# Patient Record
Sex: MALE | Race: BLACK OR AFRICAN AMERICAN | NOT HISPANIC OR LATINO | Employment: FULL TIME | URBAN - METROPOLITAN AREA
[De-identification: names, ages, dates, MRNs, and addresses within clinical notes are randomized per-mention and may not be internally consistent; named-entity substitution may affect disease eponyms.]

---

## 2018-04-16 ENCOUNTER — APPOINTMENT (EMERGENCY)
Dept: RADIOLOGY | Facility: HOSPITAL | Age: 38
End: 2018-04-16
Payer: COMMERCIAL

## 2018-04-16 ENCOUNTER — HOSPITAL ENCOUNTER (EMERGENCY)
Facility: HOSPITAL | Age: 38
Discharge: HOME | End: 2018-04-16
Attending: EMERGENCY MEDICINE | Admitting: EMERGENCY MEDICINE
Payer: COMMERCIAL

## 2018-04-16 VITALS — WEIGHT: 220 LBS | OXYGEN SATURATION: 99 % | RESPIRATION RATE: 18 BRPM | TEMPERATURE: 98.7 F | HEART RATE: 93 BPM

## 2018-04-16 DIAGNOSIS — S93.491A SPRAIN OF ANTERIOR TALOFIBULAR LIGAMENT OF RIGHT ANKLE, INITIAL ENCOUNTER: Primary | ICD-10-CM

## 2018-04-16 PROCEDURE — 73610 X-RAY EXAM OF ANKLE: CPT

## 2018-04-16 PROCEDURE — 99283 EMERGENCY DEPT VISIT LOW MDM: CPT

## 2018-04-16 PROCEDURE — 73630 X-RAY EXAM OF FOOT: CPT

## 2018-04-16 RX ORDER — IBUPROFEN 400 MG/1
800 TABLET ORAL ONCE
Status: COMPLETED | OUTPATIENT
Start: 2018-04-16 | End: 2018-04-16

## 2018-04-16 RX ORDER — NAPROXEN 500 MG/1
500 TABLET ORAL 2 TIMES DAILY WITH MEALS
Qty: 14 TABLET | Refills: 0 | Status: SHIPPED | OUTPATIENT
Start: 2018-04-16 | End: 2018-05-02 | Stop reason: SDUPTHER

## 2018-04-16 RX ADMIN — IBUPROFEN 800 MG: 400 TABLET ORAL at 11:55

## 2018-04-16 NOTE — DISCHARGE INSTRUCTIONS

## 2018-04-16 NOTE — ED PROVIDER NOTES
History  Chief Complaint   Patient presents with    Ankle Swelling     pt here with right foot and ankle swelling  Hx of plantar fasciitis but did have a fall over the weekend  Ankle Swelling   Location:  Foot and ankle  Time since incident:  2 days  Injury: no    Ankle location:  R ankle  Foot location:  Dorsum of R foot  Pain details:     Quality:  Aching    Radiates to: right leg and knee  Severity:  Moderate    Onset quality:  Gradual    Duration:  2 days    Timing:  Constant    Progression:  Worsening  Chronicity:  New  Dislocation: no    Foreign body present:  No foreign bodies  Prior injury to area:  No  Relieved by:  None tried  Worsened by:  Bearing weight  Ineffective treatments:  None tried  Associated symptoms: no back pain, no decreased ROM, no fatigue, no fever, no itching, no muscle weakness, no neck pain, no numbness, no stiffness, no swelling and no tingling        None       Past Medical History:   Diagnosis Date    Gout        History reviewed  No pertinent surgical history  History reviewed  No pertinent family history  I have reviewed and agree with the history as documented  Social History   Substance Use Topics    Smoking status: Never Smoker    Smokeless tobacco: Never Used    Alcohol use No        Review of Systems   Constitutional: Negative for activity change, appetite change, chills, fatigue and fever  HENT: Negative for ear pain, sneezing and sore throat  Eyes: Negative for pain and visual disturbance  Respiratory: Negative for cough and shortness of breath  Cardiovascular: Negative for chest pain and palpitations  Gastrointestinal: Negative for abdominal pain, blood in stool, constipation, diarrhea, nausea and vomiting  Genitourinary: Negative for dysuria and hematuria  Musculoskeletal: Positive for joint swelling  Negative for arthralgias, back pain, neck pain, neck stiffness and stiffness  Skin: Negative for itching, rash and wound  Neurological: Negative for dizziness, weakness, light-headedness, numbness and headaches  All other systems reviewed and are negative  Physical Exam  ED Triage Vitals [04/16/18 1037]   Temperature Pulse Respirations BP SpO2   98 7 °F (37 1 °C) 93 18 -- 99 %      Temp Source Heart Rate Source Patient Position - Orthostatic VS BP Location FiO2 (%)   Oral Monitor Sitting Left arm --      Pain Score       Worst Possible Pain           Orthostatic Vital Signs  Vitals:    04/16/18 1037   Pulse: 93   Patient Position - Orthostatic VS: Sitting       Physical Exam   Constitutional: He is oriented to person, place, and time  He appears well-developed and well-nourished  No distress  HENT:   Head: Normocephalic and atraumatic  Nose: Nose normal    Eyes: Conjunctivae and EOM are normal  Pupils are equal, round, and reactive to light  Neck: Normal range of motion  Neck supple  Cardiovascular: Normal rate, regular rhythm, normal heart sounds and intact distal pulses  Exam reveals no gallop and no friction rub  No murmur heard  Pulmonary/Chest: Effort normal and breath sounds normal  No respiratory distress  He has no wheezes  He has no rales  Abdominal: Soft  He exhibits no distension  There is no tenderness  Musculoskeletal: Normal range of motion  He exhibits tenderness  He exhibits no edema or deformity  Tenderness to dorsal aspect of 3 metatarsal  Full AROM of right ankle  No proc fibular tenderness  No calf tenderness  Full AROM of right ankle, knee, and hip   Lymphadenopathy:     He has no cervical adenopathy  Neurological: He is alert and oriented to person, place, and time  He displays normal reflexes  No cranial nerve deficit or sensory deficit  Skin: Skin is warm and dry  Capillary refill takes less than 2 seconds  He is not diaphoretic  No erythema  No pallor  Nursing note and vitals reviewed        ED Medications  Medications   ibuprofen (MOTRIN) tablet 800 mg (800 mg Oral Given 4/16/18 1155)       Diagnostic Studies  Results Reviewed     None                 XR foot 3+ views RIGHT   Final Result by Valarie De La O DO (04/16 1100)   Mild degenerative changes  No acute osseous abnormality  Workstation performed: [id number]         XR ankle 3+ views RIGHT   Final Result by Valarie De La O DO (04/16 1059)   Mild lateral soft tissue swelling  No acute fracture or dislocation  Workstation performed: [id number]                    Procedures  Procedures       Phone Contacts  ED Phone Contact    ED Course  ED Course                                MDM  Number of Diagnoses or Management Options  Sprain of anterior talofibular ligament of right ankle, initial encounter: new and requires workup  Diagnosis management comments: Patient is a 27-year-old male with no significant past medical history presenting to the emergency department for evaluation of ankle pain  Patient states he woke up this morning with severe right ankle pain  He states that he is on his feet often a working cannot remember any specific injuries but has severe tenderness to palpation of the right ankle anterior talofibular ligament  The patient with mild swelling lateral ankle noted  No bony tenderness palpation however does have tenderness over ligaments  No proximal fibular tenderness  Flexor range motion of right ankle and knee  Plan will be x-ray rule out fracture  Otherwise will Ace wrap, apply air cast, patient follow up with Orthopedics as needed  NSAIDs, rest, ice, compression, elevation  Patient given strict return precautions         Amount and/or Complexity of Data Reviewed  Tests in the radiology section of CPT®: ordered and reviewed  Independent visualization of images, tracings, or specimens: yes    Risk of Complications, Morbidity, and/or Mortality  Presenting problems: low  Diagnostic procedures: low  Management options: low    Patient Progress  Patient progress: improved    CritCare Time    Disposition  Final diagnoses:   Sprain of anterior talofibular ligament of right ankle, initial encounter     Time reflects when diagnosis was documented in both MDM as applicable and the Disposition within this note     Time User Action Codes Description Comment    4/16/2018 11:52 AM Marty Burrows Jagdeep [[id number]] Sprain of anterior talofibular ligament of right ankle, initial encounter       ED Disposition     ED Disposition Condition Comment    Discharge  ubengraben 80 discharge to home/self care  Condition at discharge: Stable        Follow-up Information     Follow up With Specialties Details Why Contact Info Additional [phone] [facility] Call in 1 day for ED follow up Kolby Lal [phone] [facility] Emergency Medicine  If symptoms worsen [address]  [facility], [address], [place], [place], [address]        Discharge Medication List as of 4/16/2018 11:55 AM      START taking these medications    Details   naproxen (NAPROSYN) 500 mg tablet Take 1 tablet (500 mg total) by mouth 2 (two) times a day with meals for 7 days, Starting Mon 4/16/2018, Until Mon 4/23/2018, Print           No discharge procedures on file      ED Provider  Electronically Signed by           Sushma Haynes PA-C  04/16/18 8349

## 2018-04-30 NOTE — PROGRESS NOTES
Assessment/Plan:    Diagnoses and all orders for this visit:    Pain, joint, ankle and foot, right  -     Ambulatory referral to Physical Therapy; Future    Sprain of anterior talofibular ligament of right ankle, initial encounter  -     naproxen (NAPROSYN) 500 mg tablet; Take 1 tablet (500 mg total) by mouth 2 (two) times a day with meals for 7 days      Patient with nontraumatic right anterior ankle pain with no injury or overuse  Recommend using his walking boot for 1-2 weeks  I have refilled naproxen and we will start physical therapy and re-evaluate in 4 weeks  Return in about 4 weeks (around 5/30/2018) for Recheck  Subjective:   Patient ID: Zoey Flowers is a 42 y o  male  New patient presents for gradual onset right ankle anterior pain s/p ER evaluation  Xrays of ankle and foot from 4/16/18 reveal no acute bony changes with lateral swelling  He is having trouble dorsiflexion while walking  Was given naproxen but is out  No recent injury, did have twisting injury in 2015 Xrays placed in walking boot  Review of Systems   Constitutional: Negative  HENT: Negative  Eyes: Negative  Respiratory: Negative  Cardiovascular: Negative  Gastrointestinal: Negative  Endocrine: Negative  Genitourinary: Negative  Musculoskeletal: Positive for arthralgias and joint swelling  Skin: Negative  Neurological: Negative  Psychiatric/Behavioral: Negative  The following portions of the patient's chart were reviewed and updated as appropriate: Allergy:  No Known Allergies      Past Medical History:   Diagnosis Date    Gout        History reviewed  No pertinent surgical history  Social History     Social History    Marital status: Single     Spouse name: N/A    Number of children: N/A    Years of education: N/A     Occupational History    Not on file       Social History Main Topics    Smoking status: Never Smoker    Smokeless tobacco: Never Used    Alcohol use No    Drug use: No    Sexual activity: Not on file     Other Topics Concern    Not on file     Social History Narrative    No narrative on file       Family History   Problem Relation Age of Onset    No Known Problems Mother     No Known Problems Father        Medications:    Current Outpatient Prescriptions:     naproxen (NAPROSYN) 500 mg tablet, Take 1 tablet (500 mg total) by mouth 2 (two) times a day with meals for 7 days, Disp: 14 tablet, Rfl: 0    There is no problem list on file for this patient  Objective:  Right Ankle Exam   Swelling: mild    Tenderness   Right ankle tenderness location: Diffuse anterior tenderness of the ankle joint  Range of Motion   Dorsiflexion: abnormal   Other   Sensation: normal  Pulse: present     Comments:    Normal gait            Physical Exam   Constitutional: He is oriented to person, place, and time  He appears well-developed and well-nourished  HENT:   Head: Normocephalic and atraumatic  Eyes: Conjunctivae are normal    Neck: Neck supple  Pulmonary/Chest: Effort normal    Neurological: He is alert and oriented to person, place, and time  Skin: Skin is warm and dry  Psychiatric: He has a normal mood and affect  His behavior is normal    Vitals reviewed  Neurologic Exam     Mental Status   Oriented to person, place, and time  Procedures    I have personally reviewed pertinent films in PACS

## 2018-05-02 VITALS
HEIGHT: 73 IN | BODY MASS INDEX: 29.16 KG/M2 | SYSTOLIC BLOOD PRESSURE: 110 MMHG | HEART RATE: 80 BPM | WEIGHT: 220 LBS | DIASTOLIC BLOOD PRESSURE: 81 MMHG

## 2018-05-02 DIAGNOSIS — S93.491A SPRAIN OF ANTERIOR TALOFIBULAR LIGAMENT OF RIGHT ANKLE, INITIAL ENCOUNTER: ICD-10-CM

## 2018-05-02 DIAGNOSIS — M25.571 PAIN, JOINT, ANKLE AND FOOT, RIGHT: Primary | ICD-10-CM

## 2018-05-02 PROCEDURE — 99203 OFFICE O/P NEW LOW 30 MIN: CPT | Performed by: EMERGENCY MEDICINE

## 2018-05-02 RX ORDER — NAPROXEN 500 MG/1
500 TABLET ORAL 2 TIMES DAILY WITH MEALS
Qty: 14 TABLET | Refills: 0 | Status: SHIPPED | OUTPATIENT
Start: 2018-05-02 | End: 2019-07-11

## 2018-06-07 ENCOUNTER — HOSPITAL ENCOUNTER (EMERGENCY)
Facility: HOSPITAL | Age: 38
Discharge: HOME | End: 2018-06-07
Attending: EMERGENCY MEDICINE
Payer: COMMERCIAL

## 2018-06-07 VITALS
HEART RATE: 85 BPM | WEIGHT: 214.95 LBS | BODY MASS INDEX: 28.36 KG/M2 | OXYGEN SATURATION: 98 % | DIASTOLIC BLOOD PRESSURE: 80 MMHG | RESPIRATION RATE: 18 BRPM | TEMPERATURE: 98.3 F | SYSTOLIC BLOOD PRESSURE: 121 MMHG

## 2018-06-07 DIAGNOSIS — M76.61 RIGHT ACHILLES TENDINITIS: ICD-10-CM

## 2018-06-07 DIAGNOSIS — M10.9 GOUT OF LEFT FOOT: Primary | ICD-10-CM

## 2018-06-07 PROCEDURE — 99283 EMERGENCY DEPT VISIT LOW MDM: CPT

## 2018-06-07 RX ORDER — MORPHINE SULFATE 15 MG/1
15 TABLET ORAL EVERY 4 HOURS PRN
Qty: 10 TABLET | Refills: 0 | Status: SHIPPED | OUTPATIENT
Start: 2018-06-07 | End: 2018-06-17

## 2018-06-07 RX ORDER — INDOMETHACIN 50 MG/1
50 CAPSULE ORAL 3 TIMES DAILY PRN
Qty: 15 CAPSULE | Refills: 0 | Status: SHIPPED | OUTPATIENT
Start: 2018-06-07 | End: 2018-11-14 | Stop reason: ALTCHOICE

## 2018-06-07 NOTE — DISCHARGE INSTRUCTIONS
Gout   WHAT YOU NEED TO KNOW:   Gout is a form of arthritis that causes severe joint pain, redness, swelling, and stiffness  Acute gout pain starts suddenly, gets worse quickly, and stops on its own  Acute gout can become chronic and cause permanent damage to the joints  DISCHARGE INSTRUCTIONS:   Return to the emergency department if:   · You have severe pain in one or more of your joints that you cannot tolerate  · You have a fever or redness that spreads beyond the joint area  Contact your healthcare provider if:   · You have new symptoms, such as a rash, after you start gout treatment  · Your joint pain and swelling do not go away, even after treatment  · You are not urinating as much or as often as you usually do  · You have trouble taking your gout medicines  · You have questions or concerns about your condition or care  Medicines: You may need any of the following:  · Prescription pain medicine  may be given  Ask your healthcare provider how to take this medicine safely  Some prescription pain medicines contain acetaminophen  Do not take other medicines that contain acetaminophen without talking to your healthcare provider  Too much acetaminophen may cause liver damage  Prescription pain medicine may cause constipation  Ask your healthcare provider how to prevent or treat constipation  · NSAIDs , such as ibuprofen, help decrease swelling, pain, and fever  This medicine is available with or without a doctor's order  NSAIDs can cause stomach bleeding or kidney problems in certain people  If you take blood thinner medicine, always ask your healthcare provider if NSAIDs are safe for you  Always read the medicine label and follow directions  · Gout medicine  decreases joint pain and swelling  It may also be given to prevent new gout attacks  · Steroids  reduce inflammation and can help your joint stiffness and pain during gout attacks      · Uric acid medicine  may be given to reduce the amount of uric acid your body makes  Some medicines may help you pass more uric acid when you urinate  · Take your medicine as directed  Contact your healthcare provider if you think your medicine is not helping or if you have side effects  Tell him or her if you are allergic to any medicine  Keep a list of the medicines, vitamins, and herbs you take  Include the amounts, and when and why you take them  Bring the list or the pill bottles to follow-up visits  Carry your medicine list with you in case of an emergency  Follow up with your healthcare provider as directed:  Write down your questions so you remember to ask them during your visits  Manage gout:   · Rest your painful joint so it can heal   Your healthcare provider may recommend crutches or a walker if the affected joint is in a leg  · Apply ice to your joint  Ice decreases pain and swelling  Use an ice pack, or put crushed ice in a plastic bag  Cover the ice pack or bag with a towel before you apply it to your painful joint  Apply ice for 15 to 20 minutes every hour, or as directed  · Elevate your joint  Elevation helps reduce swelling and pain  Raise your joint above the level of your heart as often as you can  Prop your painful joint on pillows to keep it above your heart comfortably  · Go to physical therapy if directed  A physical therapist can teach you exercises to improve flexibility and range of motion  Prevent gout attacks:   · Do not eat high-purine foods  These foods include meats, seafood, asparagus, spinach, cauliflower, and some types of beans  Healthcare providers may tell you to eat more low-fat milk products, such as yogurt  Milk products may decrease your risk for gout attacks  Vitamin C and coffee may also help  Your healthcare provider or dietitian can help you create a meal plan  · Drink more liquids as directed  Liquids such as water help remove uric acid from your body   Ask how much liquid to drink each day and which liquids are best for you  · Manage your weight  Weight loss may decrease the amount of uric acid in your body  Exercise can help you lose weight  Talk to your healthcare provider about the best exercises for you  · Control your blood sugar level if you have diabetes  Keep your blood sugar level in a normal range  This can help prevent gout attacks  · Limit or do not drink alcohol as directed  Alcohol can trigger a gout attack  Alcohol also increases your risk for dehydration  Ask your healthcare provider if alcohol is safe for you  © 2017 [employer]  Information is for End User's use only and may not be sold, redistributed or otherwise used for commercial purposes  All illustrations and images included in CareNotes® are the copyrighted property of [employer]  or [employer]  The above information is an  only  It is not intended as medical advice for individual conditions or treatments  Talk to your doctor, nurse or pharmacist before following any medical regimen to see if it is safe and effective for you  Achilles Tendinitis   WHAT YOU NEED TO KNOW:   Achilles tendinitis is swelling of the tendon that connects your calf muscle to your heel bone  It may happen suddenly or become a chronic condition  Your risk for Achilles tendinitis increases as you age  DISCHARGE INSTRUCTIONS:   Contact your healthcare provider if:   · You have a fever  · Your swelling or pain gets worse  · You feel or hear a sudden pop near your ankle  · You cannot bend your ankle or put pressure on your leg  · You have questions about your condition or care  Medicines:   · NSAIDs , such as ibuprofen, help decrease swelling, pain, and fever  This medicine is available with or without a doctor's order  NSAIDs can cause stomach bleeding or kidney problems in certain people   If you take blood thinner medicine, always ask your healthcare provider if NSAIDs are safe for you  Always read the medicine label and follow directions  · Take your medicine as directed  Contact your healthcare provider if you think your medicine is not helping or if you have side effects  Tell him or her if you are allergic to any medicine  Keep a list of the medicines, vitamins, and herbs you take  Include the amounts, and when and why you take them  Bring the list or the pill bottles to follow-up visits  Carry your medicine list with you in case of an emergency  Manage your Achilles tendinitis:   · Rest  as directed  Rest decreases swelling and prevents your tendinitis from getting worse  Your healthcare provider may tell you to stop your usual training or exercise activities  Ask him when you can return to your normal activities or exercise plan  · Apply ice  on your Achilles tendon for 15 to 20 minutes every hour or as directed  Use an ice pack, or put crushed ice in a plastic bag  Cover it with a towel  Ice helps prevent tissue damage and decreases swelling and pain  · Wear a compression bandage or use tape  as directed  This will decrease swelling and pain  Ask your healthcare provider how to wrap a compression bandage or apply tape  If you use a support device ask if you should wear a compression bandage or use tape  · Elevate  your heel above the level of your heart as often as you can  This will help decrease swelling and pain  Prop your heel on pillows or blankets to keep it elevated comfortably  · Stretch  as directed when you return to your exercise program  Always warm up your muscles and stretch before you exercise  Do cool down exercises and stretches when you are finished  This will keep your muscles loose and decrease stress on your Achilles tendon  · Do bilateral heel drop exercises as directed  Bilateral heel drops strengthen your Achilles tendon   Do not do the following exercise unless your healthcare provider says it is safe:     ¨ Stand at the edge of a stair or raised step  Hold onto the railing for balance  ¨ Place the front part of your foot on the stair or step  Let the back of your foot hang off of the stair or step  ¨ Slowly lift your heels off the ground and then slowly lower your heels past the stair  Do not move your heels quickly  This could make your injury worse  Repeat this exercise 20 times or as directed  · Slowly increase the time and intensity when you return to your exercise program   Start with short and low intensity exercises  Ask your healthcare provider how and when to increase the time and intensity of your exercise  Wear support devices or supportive shoes as directed  Support devices may include a splint, orthotic, or brace  These devices will decrease pressure on your Achilles tendon and help relieve pain  Supportive shoes will cushion your heel and protect your Achilles tendon  Replace shoes or sneakers that are worn out  Go to physical therapy and practice exercises as directed:  A physical therapist teaches you exercises to help improve movement and strength, and decrease pain  Practice these exercises at home as directed  Follow up with your healthcare provider as directed:  Write down your questions so you remember to ask them during your visits  © 2017 [employer] Information is for End User's use only and may not be sold, redistributed or otherwise used for commercial purposes  All illustrations and images included in CareNotes® are the copyrighted property of [employer]  or [employer]  The above information is an  only  It is not intended as medical advice for individual conditions or treatments  Talk to your doctor, nurse or pharmacist before following any medical regimen to see if it is safe and effective for you

## 2018-06-07 NOTE — ED PROVIDER NOTES
History  Chief Complaint   Patient presents with    Foot Pain     c/o right foot pain; per pt - believes it may be a gout flare  also c/o left heel pain, denies any injury  History provided by:  Patient  Toe Pain   Location:  Left great toe, over MCP joint  Quality:  Dull constant  Severity:  Moderate  Onset quality:  Gradual  Duration:  2 days  Timing:  Constant  Progression:  Worsening  Chronicity:  Recurrent  Context:  History of gout, states this feels like gout  Relieved by:  Holding still  Worsened by: Any movement  Ineffective treatments:  Tylenol  Associated symptoms: no chest pain, no fever, no headaches, no rash and no shortness of breath    Associated symptoms comment:  Patient also nose is been having some pain over located over his right Achilles tendon at the insertion point to calcaneus  This is been acute on chronic process, states this was not the main reason why came to the hospital today, but as he was here he wanted evaluated as well  Prior to Admission Medications   Prescriptions Last Dose Informant Patient Reported? Taking?   naproxen (NAPROSYN) 500 mg tablet   No No   Sig: Take 1 tablet (500 mg total) by mouth 2 (two) times a day with meals for 7 days      Facility-Administered Medications: None       Past Medical History:   Diagnosis Date    Gout        History reviewed  No pertinent surgical history  Family History   Problem Relation Age of Onset    No Known Problems Mother     No Known Problems Father      I have reviewed and agree with the history as documented  Social History   Substance Use Topics    Smoking status: Never Smoker    Smokeless tobacco: Never Used    Alcohol use No        Review of Systems   Constitutional: Negative for fever  Respiratory: Negative for chest tightness and shortness of breath  Cardiovascular: Negative for chest pain  Skin: Negative for rash  Neurological: Negative for dizziness, light-headedness and headaches  Physical Exam  Physical Exam   Constitutional: He appears well-developed  HENT:   Head: Atraumatic  Eyes: Conjunctivae are normal  Right eye exhibits no discharge  Left eye exhibits no discharge  No scleral icterus  Neck: Neck supple  No tracheal deviation present  Pulmonary/Chest: Effort normal  No stridor  No respiratory distress  Musculoskeletal: He exhibits no deformity  First MCP joint left foot, mild erythema, severe pain with any passive or active range of motion of this joint  Right foot, Achilles tendon insertion site on calcaneus mild tenderness over this area, full range of motion no evidence of Achilles tendon rupture  Neurological: He is alert  He exhibits normal muscle tone  Coordination normal    Skin: Skin is warm and dry  No rash noted  No erythema  No pallor  Psychiatric: He has a normal mood and affect  Vitals reviewed  Vital Signs  ED Triage Vitals   Temperature Pulse Respirations Blood Pressure SpO2   06/07/18 0943 06/07/18 0944 06/07/18 0944 06/07/18 0944 06/07/18 0944   98 3 °F (36 8 °C) 85 18 121/80 98 %      Temp Source Heart Rate Source Patient Position - Orthostatic VS BP Location FiO2 (%)   06/07/18 0943 06/07/18 0944 -- -- --   Oral Monitor         Pain Score       --                  Vitals:    06/07/18 0944   BP: 121/80   Pulse: 85       Visual Acuity      ED Medications  Medications - No data to display    Diagnostic Studies  Results Reviewed     None                 No orders to display              Procedures  Procedures       Phone Contacts  ED Phone Contact    ED Course                               MDM  Number of Diagnoses or Management Options  Gout of left foot: new and requires workup  Right Achilles tendinitis: new and requires workup  Diagnosis management comments: History of gout, clinically presents as gout, will treat as gout  Mild Achilles tendinitis on the contralateral side  , indomethacin will help with this and recommended over-the-counter Tylenol and ibuprofen thereafter  , patient to follow with PCP       Amount and/or Complexity of Data Reviewed  Review and summarize past medical records: yes      CritCare Time    Disposition  Final diagnoses:   Gout of left foot   Right Achilles tendinitis     Time reflects when diagnosis was documented in both MDM as applicable and the Disposition within this note     Time User Action Codes Description Comment    6/7/2018  9:50 AM Sharmila Harry Add [M10 9] Gout of left foot     6/7/2018  9:50 AM Sharmila Leivaivette Add [M76 61] Right Achilles tendinitis       ED Disposition     ED Disposition Condition Comment    Discharge  ubengnatty 80 discharge to home/self care  Condition at discharge: Good        Follow-up Information    None         Discharge Medication List as of 6/7/2018  9:51 AM      START taking these medications    Details   indomethacin (INDOCIN) 50 mg capsule Take 1 capsule (50 mg total) by mouth 3 (three) times a day as needed for mild pain, Starting u 6/7/2018, Print      morphine (MSIR) 15 mg tablet Take 1 tablet (15 mg total) by mouth every 4 (four) hours as needed for severe pain for up to 10 days Max Daily Amount: 90 mg, Starting u 6/7/2018, Until Sun 6/17/2018, Print         CONTINUE these medications which have NOT CHANGED    Details   naproxen (NAPROSYN) 500 mg tablet Take 1 tablet (500 mg total) by mouth 2 (two) times a day with meals for 7 days, Starting Wed 5/2/2018, Until Wed 5/9/2018, Print           No discharge procedures on file      ED Provider  Electronically Signed by           Marquis Cyril DO  06/07/18 7444

## 2018-06-25 ENCOUNTER — TRANSCRIBE ORDERS (OUTPATIENT)
Dept: LAB | Facility: CLINIC | Age: 38
End: 2018-06-25

## 2018-06-25 ENCOUNTER — APPOINTMENT (OUTPATIENT)
Dept: LAB | Facility: CLINIC | Age: 38
End: 2018-06-25
Payer: COMMERCIAL

## 2018-06-25 DIAGNOSIS — Z13.828 SCREENING FOR RHEUMATOID ARTHRITIS: Primary | ICD-10-CM

## 2018-06-25 DIAGNOSIS — M10.9 GOUT, UNSPECIFIED CAUSE, UNSPECIFIED CHRONICITY, UNSPECIFIED SITE: ICD-10-CM

## 2018-06-25 DIAGNOSIS — Z13.828 SCREENING FOR RHEUMATOID ARTHRITIS: ICD-10-CM

## 2018-06-25 DIAGNOSIS — M25.572 LEFT ANKLE PAIN, UNSPECIFIED CHRONICITY: ICD-10-CM

## 2018-06-25 LAB
ALBUMIN SERPL BCP-MCNC: 3.4 G/DL (ref 3.5–5)
ALP SERPL-CCNC: 82 U/L (ref 46–116)
ALT SERPL W P-5'-P-CCNC: 68 U/L (ref 12–78)
ANION GAP SERPL CALCULATED.3IONS-SCNC: 6 MMOL/L (ref 4–13)
AST SERPL W P-5'-P-CCNC: 38 U/L (ref 5–45)
BILIRUB SERPL-MCNC: 0.5 MG/DL (ref 0.2–1)
BUN SERPL-MCNC: 16 MG/DL (ref 5–25)
CALCIUM SERPL-MCNC: 8.7 MG/DL (ref 8.3–10.1)
CHLORIDE SERPL-SCNC: 104 MMOL/L (ref 100–108)
CO2 SERPL-SCNC: 29 MMOL/L (ref 21–32)
CREAT SERPL-MCNC: 1.5 MG/DL (ref 0.6–1.3)
CRP SERPL QL: 9.3 MG/L
ERYTHROCYTE [DISTWIDTH] IN BLOOD BY AUTOMATED COUNT: 13.4 % (ref 11.6–15.1)
ERYTHROCYTE [SEDIMENTATION RATE] IN BLOOD: 11 MM/HOUR (ref 0–10)
GFR SERPL CREATININE-BSD FRML MDRD: 67 ML/MIN/1.73SQ M
GLUCOSE P FAST SERPL-MCNC: 92 MG/DL (ref 65–99)
HCT VFR BLD AUTO: 44.6 % (ref 36.5–49.3)
HGB BLD-MCNC: 14.5 G/DL (ref 12–17)
MCH RBC QN AUTO: 27.9 PG (ref 26.8–34.3)
MCHC RBC AUTO-ENTMCNC: 32.5 G/DL (ref 31.4–37.4)
MCV RBC AUTO: 86 FL (ref 82–98)
PLATELET # BLD AUTO: 189 THOUSANDS/UL (ref 149–390)
PMV BLD AUTO: 10.7 FL (ref 8.9–12.7)
POTASSIUM SERPL-SCNC: 4.3 MMOL/L (ref 3.5–5.3)
PROT SERPL-MCNC: 7.5 G/DL (ref 6.4–8.2)
RBC # BLD AUTO: 5.2 MILLION/UL (ref 3.88–5.62)
SODIUM SERPL-SCNC: 139 MMOL/L (ref 136–145)
URATE SERPL-MCNC: 9.3 MG/DL (ref 4.2–8)
WBC # BLD AUTO: 4.26 THOUSAND/UL (ref 4.31–10.16)

## 2018-06-25 PROCEDURE — 85027 COMPLETE CBC AUTOMATED: CPT

## 2018-06-25 PROCEDURE — 86140 C-REACTIVE PROTEIN: CPT

## 2018-06-25 PROCEDURE — 86430 RHEUMATOID FACTOR TEST QUAL: CPT

## 2018-06-25 PROCEDURE — 85652 RBC SED RATE AUTOMATED: CPT

## 2018-06-25 PROCEDURE — 84550 ASSAY OF BLOOD/URIC ACID: CPT

## 2018-06-25 PROCEDURE — 86200 CCP ANTIBODY: CPT

## 2018-06-25 PROCEDURE — 36415 COLL VENOUS BLD VENIPUNCTURE: CPT

## 2018-06-25 PROCEDURE — 80053 COMPREHEN METABOLIC PANEL: CPT

## 2018-06-26 LAB — RHEUMATOID FACT SER QL LA: NEGATIVE

## 2018-06-27 LAB — CCP IGA+IGG SERPL IA-ACNC: 13 UNITS (ref 0–19)

## 2018-11-14 ENCOUNTER — OFFICE VISIT (OUTPATIENT)
Dept: INTERNAL MEDICINE CLINIC | Facility: CLINIC | Age: 38
End: 2018-11-14
Payer: COMMERCIAL

## 2018-11-14 VITALS
OXYGEN SATURATION: 98 % | WEIGHT: 215 LBS | HEART RATE: 76 BPM | BODY MASS INDEX: 28.49 KG/M2 | HEIGHT: 73 IN | SYSTOLIC BLOOD PRESSURE: 102 MMHG | DIASTOLIC BLOOD PRESSURE: 78 MMHG

## 2018-11-14 DIAGNOSIS — Z13.0 SCREENING FOR DEFICIENCY ANEMIA: ICD-10-CM

## 2018-11-14 DIAGNOSIS — Z13.220 SCREENING, LIPID: ICD-10-CM

## 2018-11-14 DIAGNOSIS — L80 VITILIGO: ICD-10-CM

## 2018-11-14 DIAGNOSIS — Z23 NEED FOR INFLUENZA VACCINATION: Primary | ICD-10-CM

## 2018-11-14 PROCEDURE — 1036F TOBACCO NON-USER: CPT | Performed by: NURSE PRACTITIONER

## 2018-11-14 PROCEDURE — 99203 OFFICE O/P NEW LOW 30 MIN: CPT | Performed by: NURSE PRACTITIONER

## 2018-11-14 PROCEDURE — 3008F BODY MASS INDEX DOCD: CPT | Performed by: NURSE PRACTITIONER

## 2018-11-14 RX ORDER — BETAMETHASONE DIPROPIONATE 0.5 MG/G
CREAM TOPICAL 2 TIMES DAILY
Qty: 30 G | Refills: 0 | Status: SHIPPED | OUTPATIENT
Start: 2018-11-14 | End: 2021-10-06 | Stop reason: ALTCHOICE

## 2018-11-14 NOTE — PROGRESS NOTES
Assessment/Plan:     Diagnoses and all orders for this visit:    Need for influenza vaccination    Vitiligo  -     betamethasone dipropionate (DIPROSONE) 0 05 % cream; Apply topically 2 (two) times a day  -     Ambulatory referral to Dermatology; Future    Screening, lipid  -     Comprehensive metabolic panel; Future  -     Lipid Panel with Direct LDL reflex; Future    Screening for deficiency anemia  -     CBC and differential; Future    Other orders  -     Cancel: FIRST LINE: influenza vaccine, [id number], quadrivalent, recombinant, PF, 0 5 mL (FLUBLOK)          Subjective:      Patient ID: Doris Soto is a 42 y o  male  Here to [facility] care  Healthy, no daily medications, no chronic medical conditions     Here for left ankle discoloration   Noticed it a week ago   No pain, itching, or associated rash   Denies any other similar areas          The following portions of the patient's history were reviewed and updated as appropriate: allergies, current medications, past family history, past medical history, past social history, past surgical history and problem list     Review of Systems   Constitutional: Negative  Respiratory: Negative  Cardiovascular: Negative  Skin: Positive for color change  Negative for rash  Neurological: Negative  Objective:      /78 (BP Location: Left arm, Patient Position: Sitting, Cuff Size: Large)   Pulse 76   Ht 6' 1" (1 854 m)   Wt 97 5 kg (215 lb)   SpO2 98%   BMI 28 37 kg/m²          Physical Exam   Constitutional: He is oriented to person, place, and time  He appears well-developed and well-nourished  Cardiovascular: Normal rate, regular rhythm and normal heart sounds  Pulmonary/Chest: Effort normal and breath sounds normal    Neurological: He is alert and oriented to person, place, and time  Skin:        Psychiatric: He has a normal mood and affect  His behavior is normal    Vitals reviewed

## 2018-11-19 ENCOUNTER — APPOINTMENT (OUTPATIENT)
Dept: LAB | Facility: CLINIC | Age: 38
End: 2018-11-19
Payer: COMMERCIAL

## 2018-11-19 DIAGNOSIS — Z13.220 SCREENING, LIPID: ICD-10-CM

## 2018-11-19 DIAGNOSIS — Z13.0 SCREENING FOR DEFICIENCY ANEMIA: ICD-10-CM

## 2018-11-19 LAB
ALBUMIN SERPL BCP-MCNC: 3.8 G/DL (ref 3.5–5)
ALP SERPL-CCNC: 76 U/L (ref 46–116)
ALT SERPL W P-5'-P-CCNC: 42 U/L (ref 12–78)
ANION GAP SERPL CALCULATED.3IONS-SCNC: 8 MMOL/L (ref 4–13)
AST SERPL W P-5'-P-CCNC: 30 U/L (ref 5–45)
BASOPHILS # BLD AUTO: 0.03 THOUSANDS/ΜL (ref 0–0.1)
BASOPHILS NFR BLD AUTO: 1 % (ref 0–1)
BILIRUB SERPL-MCNC: 1.1 MG/DL (ref 0.2–1)
BUN SERPL-MCNC: 15 MG/DL (ref 5–25)
CALCIUM SERPL-MCNC: 9 MG/DL (ref 8.3–10.1)
CHLORIDE SERPL-SCNC: 103 MMOL/L (ref 100–108)
CHOLEST SERPL-MCNC: 204 MG/DL (ref 50–200)
CO2 SERPL-SCNC: 28 MMOL/L (ref 21–32)
CREAT SERPL-MCNC: 1.51 MG/DL (ref 0.6–1.3)
EOSINOPHIL # BLD AUTO: 0.06 THOUSAND/ΜL (ref 0–0.61)
EOSINOPHIL NFR BLD AUTO: 2 % (ref 0–6)
ERYTHROCYTE [DISTWIDTH] IN BLOOD BY AUTOMATED COUNT: 13.2 % (ref 11.6–15.1)
GFR SERPL CREATININE-BSD FRML MDRD: 66 ML/MIN/1.73SQ M
GLUCOSE P FAST SERPL-MCNC: 87 MG/DL (ref 65–99)
HCT VFR BLD AUTO: 46.8 % (ref 36.5–49.3)
HDLC SERPL-MCNC: 43 MG/DL (ref 40–60)
HGB BLD-MCNC: 15.6 G/DL (ref 12–17)
IMM GRANULOCYTES # BLD AUTO: 0.01 THOUSAND/UL (ref 0–0.2)
IMM GRANULOCYTES NFR BLD AUTO: 0 % (ref 0–2)
LDLC SERPL CALC-MCNC: 149 MG/DL (ref 0–100)
LYMPHOCYTES # BLD AUTO: 1.1 THOUSANDS/ΜL (ref 0.6–4.47)
LYMPHOCYTES NFR BLD AUTO: 31 % (ref 14–44)
MCH RBC QN AUTO: 28.3 PG (ref 26.8–34.3)
MCHC RBC AUTO-ENTMCNC: 33.3 G/DL (ref 31.4–37.4)
MCV RBC AUTO: 85 FL (ref 82–98)
MONOCYTES # BLD AUTO: 0.36 THOUSAND/ΜL (ref 0.17–1.22)
MONOCYTES NFR BLD AUTO: 10 % (ref 4–12)
NEUTROPHILS # BLD AUTO: 1.98 THOUSANDS/ΜL (ref 1.85–7.62)
NEUTS SEG NFR BLD AUTO: 56 % (ref 43–75)
NRBC BLD AUTO-RTO: 0 /100 WBCS
PLATELET # BLD AUTO: 156 THOUSANDS/UL (ref 149–390)
PMV BLD AUTO: 10.7 FL (ref 8.9–12.7)
POTASSIUM SERPL-SCNC: 4.2 MMOL/L (ref 3.5–5.3)
PROT SERPL-MCNC: 7.6 G/DL (ref 6.4–8.2)
RBC # BLD AUTO: 5.52 MILLION/UL (ref 3.88–5.62)
SODIUM SERPL-SCNC: 139 MMOL/L (ref 136–145)
TRIGL SERPL-MCNC: 58 MG/DL
WBC # BLD AUTO: 3.54 THOUSAND/UL (ref 4.31–10.16)

## 2018-11-19 PROCEDURE — 36415 COLL VENOUS BLD VENIPUNCTURE: CPT

## 2018-11-19 PROCEDURE — 80061 LIPID PANEL: CPT

## 2018-11-19 PROCEDURE — 85025 COMPLETE CBC W/AUTO DIFF WBC: CPT

## 2018-11-19 PROCEDURE — 80053 COMPREHEN METABOLIC PANEL: CPT

## 2019-06-05 ENCOUNTER — APPOINTMENT (EMERGENCY)
Dept: RADIOLOGY | Facility: HOSPITAL | Age: 39
End: 2019-06-05
Payer: COMMERCIAL

## 2019-06-05 ENCOUNTER — HOSPITAL ENCOUNTER (EMERGENCY)
Facility: HOSPITAL | Age: 39
Discharge: HOME | End: 2019-06-05
Attending: EMERGENCY MEDICINE | Admitting: EMERGENCY MEDICINE
Payer: COMMERCIAL

## 2019-06-05 VITALS
TEMPERATURE: 98.5 F | HEART RATE: 84 BPM | OXYGEN SATURATION: 99 % | DIASTOLIC BLOOD PRESSURE: 84 MMHG | SYSTOLIC BLOOD PRESSURE: 121 MMHG

## 2019-06-05 DIAGNOSIS — M10.9: Primary | ICD-10-CM

## 2019-06-05 DIAGNOSIS — E79.0 ELEVATED URIC ACID IN BLOOD: ICD-10-CM

## 2019-06-05 LAB
ALBUMIN SERPL BCP-MCNC: 3.7 G/DL (ref 3.5–5)
ALP SERPL-CCNC: 90 U/L (ref 46–116)
ALT SERPL W P-5'-P-CCNC: 31 U/L (ref 12–78)
ANION GAP SERPL CALCULATED.3IONS-SCNC: 5 MMOL/L (ref 4–13)
AST SERPL W P-5'-P-CCNC: 32 U/L (ref 5–45)
BASOPHILS # BLD AUTO: 0.03 THOUSANDS/ΜL (ref 0–0.1)
BASOPHILS NFR BLD AUTO: 1 % (ref 0–1)
BILIRUB SERPL-MCNC: 0.9 MG/DL (ref 0.2–1)
BUN SERPL-MCNC: 13 MG/DL (ref 5–25)
CALCIUM SERPL-MCNC: 9.1 MG/DL (ref 8.3–10.1)
CHLORIDE SERPL-SCNC: 104 MMOL/L (ref 100–108)
CO2 SERPL-SCNC: 28 MMOL/L (ref 21–32)
CREAT SERPL-MCNC: 1.57 MG/DL (ref 0.6–1.3)
EOSINOPHIL # BLD AUTO: 0.04 THOUSAND/ΜL (ref 0–0.61)
EOSINOPHIL NFR BLD AUTO: 1 % (ref 0–6)
ERYTHROCYTE [DISTWIDTH] IN BLOOD BY AUTOMATED COUNT: 13.1 % (ref 11.6–15.1)
GFR SERPL CREATININE-BSD FRML MDRD: 63 ML/MIN/1.73SQ M
GLUCOSE SERPL-MCNC: 93 MG/DL (ref 65–140)
HCT VFR BLD AUTO: 50.2 % (ref 36.5–49.3)
HGB BLD-MCNC: 16.6 G/DL (ref 12–17)
IMM GRANULOCYTES # BLD AUTO: 0.01 THOUSAND/UL (ref 0–0.2)
IMM GRANULOCYTES NFR BLD AUTO: 0 % (ref 0–2)
LYMPHOCYTES # BLD AUTO: 0.75 THOUSANDS/ΜL (ref 0.6–4.47)
LYMPHOCYTES NFR BLD AUTO: 16 % (ref 14–44)
MCH RBC QN AUTO: 28.5 PG (ref 26.8–34.3)
MCHC RBC AUTO-ENTMCNC: 33.1 G/DL (ref 31.4–37.4)
MCV RBC AUTO: 86 FL (ref 82–98)
MONOCYTES # BLD AUTO: 0.47 THOUSAND/ΜL (ref 0.17–1.22)
MONOCYTES NFR BLD AUTO: 10 % (ref 4–12)
NEUTROPHILS # BLD AUTO: 3.55 THOUSANDS/ΜL (ref 1.85–7.62)
NEUTS SEG NFR BLD AUTO: 72 % (ref 43–75)
NRBC BLD AUTO-RTO: 0 /100 WBCS
PLATELET # BLD AUTO: 163 THOUSANDS/UL (ref 149–390)
PMV BLD AUTO: 10.4 FL (ref 8.9–12.7)
POTASSIUM SERPL-SCNC: 4.3 MMOL/L (ref 3.5–5.3)
PROT SERPL-MCNC: 8.1 G/DL (ref 6.4–8.2)
RBC # BLD AUTO: 5.82 MILLION/UL (ref 3.88–5.62)
SODIUM SERPL-SCNC: 137 MMOL/L (ref 136–145)
URATE SERPL-MCNC: 9.5 MG/DL (ref 4.2–8)
WBC # BLD AUTO: 4.85 THOUSAND/UL (ref 4.31–10.16)

## 2019-06-05 PROCEDURE — 84550 ASSAY OF BLOOD/URIC ACID: CPT | Performed by: PHYSICIAN ASSISTANT

## 2019-06-05 PROCEDURE — 99283 EMERGENCY DEPT VISIT LOW MDM: CPT

## 2019-06-05 PROCEDURE — 96374 THER/PROPH/DIAG INJ IV PUSH: CPT

## 2019-06-05 PROCEDURE — 73630 X-RAY EXAM OF FOOT: CPT

## 2019-06-05 PROCEDURE — 36415 COLL VENOUS BLD VENIPUNCTURE: CPT | Performed by: PHYSICIAN ASSISTANT

## 2019-06-05 PROCEDURE — 99284 EMERGENCY DEPT VISIT MOD MDM: CPT | Performed by: PHYSICIAN ASSISTANT

## 2019-06-05 PROCEDURE — 80053 COMPREHEN METABOLIC PANEL: CPT | Performed by: PHYSICIAN ASSISTANT

## 2019-06-05 PROCEDURE — 85025 COMPLETE CBC W/AUTO DIFF WBC: CPT | Performed by: PHYSICIAN ASSISTANT

## 2019-06-05 RX ORDER — INDOMETHACIN 50 MG/1
50 CAPSULE ORAL EVERY 8 HOURS PRN
Qty: 20 CAPSULE | Refills: 0 | Status: SHIPPED | OUTPATIENT
Start: 2019-06-05 | End: 2019-07-11

## 2019-06-05 RX ORDER — KETOROLAC TROMETHAMINE 30 MG/ML
30 INJECTION, SOLUTION INTRAMUSCULAR; INTRAVENOUS ONCE
Status: COMPLETED | OUTPATIENT
Start: 2019-06-05 | End: 2019-06-05

## 2019-06-05 RX ORDER — OXYCODONE HYDROCHLORIDE AND ACETAMINOPHEN 5; 325 MG/1; MG/1
1 TABLET ORAL EVERY 4 HOURS PRN
Qty: 10 TABLET | Refills: 0 | Status: SHIPPED | OUTPATIENT
Start: 2019-06-05 | End: 2019-06-08

## 2019-06-05 RX ADMIN — KETOROLAC TROMETHAMINE 30 MG: 30 INJECTION, SOLUTION INTRAMUSCULAR at 09:58

## 2019-06-14 ENCOUNTER — OFFICE VISIT (OUTPATIENT)
Dept: INTERNAL MEDICINE CLINIC | Facility: CLINIC | Age: 39
End: 2019-06-14
Payer: COMMERCIAL

## 2019-06-14 VITALS
TEMPERATURE: 97.8 F | DIASTOLIC BLOOD PRESSURE: 80 MMHG | OXYGEN SATURATION: 98 % | SYSTOLIC BLOOD PRESSURE: 131 MMHG | HEIGHT: 73 IN | BODY MASS INDEX: 28.41 KG/M2 | WEIGHT: 214.4 LBS | HEART RATE: 72 BPM

## 2019-06-14 DIAGNOSIS — R79.89 ELEVATED SERUM CREATININE: ICD-10-CM

## 2019-06-14 DIAGNOSIS — M10.371 ACUTE GOUT DUE TO RENAL IMPAIRMENT INVOLVING RIGHT FOOT: Primary | ICD-10-CM

## 2019-06-14 PROCEDURE — 99214 OFFICE O/P EST MOD 30 MIN: CPT | Performed by: NURSE PRACTITIONER

## 2019-06-14 PROCEDURE — 3008F BODY MASS INDEX DOCD: CPT | Performed by: NURSE PRACTITIONER

## 2019-06-14 RX ORDER — PREDNISONE 10 MG/1
TABLET ORAL
Qty: 30 TABLET | Refills: 0 | Status: SHIPPED | OUTPATIENT
Start: 2019-06-14 | End: 2020-08-05 | Stop reason: SDUPTHER

## 2019-06-14 RX ORDER — ALLOPURINOL 100 MG/1
100 TABLET ORAL DAILY
Qty: 30 TABLET | Refills: 2 | Status: SHIPPED | OUTPATIENT
Start: 2019-06-14 | End: 2019-07-11

## 2019-06-26 ENCOUNTER — OFFICE VISIT (OUTPATIENT)
Dept: PODIATRY | Facility: CLINIC | Age: 39
End: 2019-06-26
Payer: COMMERCIAL

## 2019-06-26 VITALS
WEIGHT: 215 LBS | HEIGHT: 73 IN | SYSTOLIC BLOOD PRESSURE: 130 MMHG | BODY MASS INDEX: 28.49 KG/M2 | DIASTOLIC BLOOD PRESSURE: 84 MMHG

## 2019-06-26 DIAGNOSIS — M1A.3711 CHRONIC GOUT OF RIGHT FOOT DUE TO RENAL IMPAIRMENT WITH TOPHUS: Primary | ICD-10-CM

## 2019-06-26 DIAGNOSIS — M79.671 RIGHT FOOT PAIN: ICD-10-CM

## 2019-06-26 PROBLEM — M1A.3790: Status: ACTIVE | Noted: 2019-06-26

## 2019-06-26 PROCEDURE — 99243 OFF/OP CNSLTJ NEW/EST LOW 30: CPT | Performed by: PODIATRIST

## 2019-07-11 ENCOUNTER — CONSULT (OUTPATIENT)
Dept: NEPHROLOGY | Facility: CLINIC | Age: 39
End: 2019-07-11
Payer: COMMERCIAL

## 2019-07-11 VITALS
WEIGHT: 214 LBS | DIASTOLIC BLOOD PRESSURE: 60 MMHG | SYSTOLIC BLOOD PRESSURE: 112 MMHG | BODY MASS INDEX: 28.36 KG/M2 | HEIGHT: 73 IN

## 2019-07-11 DIAGNOSIS — E79.0 HYPERURICEMIA: ICD-10-CM

## 2019-07-11 DIAGNOSIS — M10.371 ACUTE GOUT DUE TO RENAL IMPAIRMENT INVOLVING RIGHT FOOT: ICD-10-CM

## 2019-07-11 DIAGNOSIS — R94.4 ABNORMAL RENAL FUNCTION TEST: Primary | ICD-10-CM

## 2019-07-11 PROBLEM — M1A.0710 IDIOPATHIC CHRONIC GOUT OF RIGHT FOOT: Status: ACTIVE | Noted: 2019-07-11

## 2019-07-11 PROBLEM — M10.9 GOUT: Status: ACTIVE | Noted: 2019-07-11

## 2019-07-11 PROCEDURE — 99245 OFF/OP CONSLTJ NEW/EST HI 55: CPT | Performed by: INTERNAL MEDICINE

## 2019-07-11 RX ORDER — FEBUXOSTAT 40 MG/1
40 TABLET, FILM COATED ORAL DAILY
Qty: 30 TABLET | Refills: 3 | Status: SHIPPED | OUTPATIENT
Start: 2019-07-11 | End: 2019-07-25 | Stop reason: ALTCHOICE

## 2019-07-11 NOTE — PATIENT INSTRUCTIONS
1  )  Low 2 g sodium diet    2 )  Monitor weights at home    3 )  Avoid NSAIDs    4 )  Monitor blood pressure at home, call if blood pressure greater than 150/90 persistently    5 ) Check 24 hour urine    6 ) Check blood and urine tests    7 ) Start Uloric 40 mg daily    8 ) Stop Allopurinol

## 2019-07-11 NOTE — PROGRESS NOTES
NEPHROLOGY OUTPATIENT CONSULTATION   Guanaco Simmons 34 y o  male MRN: [id number]  Date: 7/11/2019  Reason for consultation:   Chief Complaint   Patient presents with    Consult       ASSESSMENT and PLAN:    Thank you for the courtesy of this consultation  I had the pleasure of seeing Lei Burnham today in the renal clinic for the initial management of abnormal renal function test     1 ) Abnormal renal function test  -was unable to provide a urine specimen the office  -check a urinalysis with microscopy, if there is an active sediment will check serologies  -check a 24 hour urine for creatinine clearance  -check Cystatin C  -repeat basic metabolic panel in 1-2 weeks  -avoid NSAIDs  -my concern is that chronic hyperuricemia 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 leading to a mild degree of chronic kidney disease from deposition of sodium urate crystals in the medullary interstitium  Though it is possible that he may have some renal impairment in uric acid secretion his GFR > 60 cc/min, makes me believe more the former  -check a urine uric acid level  -check a renal ultrasound  -will use the following test get a better assessment of his renal function which I suspect is a lot better than with the blood work is reporting    2 ) Blood pressure/volume status  -normotensive and euvolemic    3 ) Hyperuricemia/gout  -will discontinue allopurinol  -low red meat and low purine diet  -avoid NSAIDs at this time would recommend prednisone for acute gout attacks  -discontinue allopurinol and start Uloric at 40 mg daily  -the rest of the renal workup as detailed above        PATIENT INSTRUCTIONS:    Patient Instructions   1 )  Low 2 g sodium diet    2 )  Monitor weights at home    3 )  Avoid NSAIDs    4 )  Monitor blood pressure at home, call if blood pressure greater than 150/90 persistently    5 ) Check 24 hour urine    6 ) Check blood and urine tests    7 ) Start Uloric 40 mg daily    8 ) Stop Allopurinol        HISTORY OF PRESENT ILLNESS:  Requesting Physician: YARY العلي    Chiara Schmid is a 34 y o  male who  male with no significant medical history except for gout for the past 7 years with chronic hyperuricemia who presents for an abnormal renal function test   He has no history of hypertension or diabetes  He does not smoke  He does not take NSAIDs on a daily basis only takes it during acute flare-up of gout  Recently restarted on allopurinol every other day but does not take it on a daily basis in the past   Does not use any [place] herbs  Reports no urinary symptoms no blood and no foamy urine  No family history of kidney disease that he is aware of  No skin rashes or lesions  No swelling the legs  No chest pain or shortness of Breath  His creatinine last year was 1 5 and has been pretty consistent at 1 5 for the past year  There is no prior blood work to this to compare to  There was a concern of whether his renal dysfunction is leading to hyperuricemia  He does not body build and does not take creatine shakes      PAST MEDICAL HISTORY:  Past Medical History:   Diagnosis Date    Gout        PAST SURGICAL HISTORY:  Past Surgical History:   Procedure Laterality Date    APPENDECTOMY         ALLERGIES:  No Known Allergies    SOCIAL HISTORY:  Social History     Substance and Sexual Activity   Alcohol Use No     Social History     Substance and Sexual Activity   Drug Use No     Social History     Tobacco Use   Smoking Status Never Smoker   Smokeless Tobacco Never Used       FAMILY HISTORY:  Family History   Problem Relation Age of Onset    Diabetes Mother     No Known Problems Father        MEDICATIONS:    Current Outpatient Medications:     betamethasone dipropionate (DIPROSONE) 0 05 % cream, Apply topically 2 (two) times a day (Patient not taking: Reported on 6/14/2019), Disp: 30 g, Rfl: 0    febuxostat (ULORIC) 40 mg tablet, Take 1 tablet (40 mg total) by mouth daily, Disp: 30 tablet, Rfl: 3    predniSONE 10 mg tablet, Take 4 tablets daily for 4 days then 3 tablets daily for 3 days then 2 tablets daily for 2 days then 1 tablet daily for 1 day then stop (Patient not taking: Reported on 7/11/2019), Disp: 30 tablet, Rfl: 0    REVIEW OF SYSTEMS:  Review of Systems   Constitutional: Negative for activity change and fever  Respiratory: Negative for cough, chest tightness, shortness of breath and wheezing  Cardiovascular: Negative for chest pain and leg swelling  Gastrointestinal: Negative for abdominal pain, diarrhea, nausea and vomiting  Endocrine: Negative for polyuria  Genitourinary: Negative for difficulty urinating, dysuria, flank pain, frequency and urgency  Skin: Negative for rash  Neurological: Negative for dizziness, syncope, light-headedness and headaches  All the systems were reviewed and were negative except as documented on the HPI  PHYSICAL EXAM:  Current Weight: Body mass index is 28 23 kg/m²  Vitals:    07/11/19 1426 07/11/19 1507   BP:  112/60   Weight: 97 1 kg (214 lb)    Height: 6' 1" (1 854 m)        Physical Exam   Constitutional: He is oriented to person, place, and time  He appears well-developed and well-nourished  No distress  HENT:   Head: Normocephalic and atraumatic  Eyes: Pupils are equal, round, and reactive to light  No scleral icterus  Neck: Normal range of motion  Neck supple  Cardiovascular: Normal rate, regular rhythm and normal heart sounds  Exam reveals no gallop and no friction rub  No murmur heard  Pulmonary/Chest: Effort normal and breath sounds normal  No respiratory distress  He has no wheezes  He has no rales  He exhibits no tenderness  Abdominal: Soft  Bowel sounds are normal  He exhibits no distension  There is no tenderness  There is no rebound  Musculoskeletal: Normal range of motion  He exhibits no edema  Neurological: He is alert and oriented to person, place, and time  Skin: No rash noted   He is not diaphoretic  Psychiatric: He has a normal mood and affect  Nursing note and vitals reviewed        Laboratory results:   Results for orders placed or performed during the hospital encounter of 06/05/19   CBC and differential   Result Value Ref Range    WBC 4 85 4 31 - 10 16 Thousand/uL    RBC 5 82 (H) 3 88 - 5 62 Million/uL    Hemoglobin 16 6 12 0 - 17 0 g/dL    Hematocrit 50 2 (H) 36 5 - 49 3 %    MCV 86 82 - 98 fL    MCH 28 5 26 8 - 34 3 pg    MCHC 33 1 31 4 - 37 4 g/dL    RDW 13 1 11 6 - 15 1 %    MPV 10 4 8 9 - 12 7 fL    Platelets 198 694 - 886 Thousands/uL    nRBC 0 /100 WBCs    Neutrophils Relative 72 43 - 75 %    Immat GRANS % 0 0 - 2 %    Lymphocytes Relative 16 14 - 44 %    Monocytes Relative 10 4 - 12 %    Eosinophils Relative 1 0 - 6 %    Basophils Relative 1 0 - 1 %    Neutrophils Absolute 3 55 1 85 - 7 62 Thousands/µL    Immature Grans Absolute 0 01 0 00 - 0 20 Thousand/uL    Lymphocytes Absolute 0 75 0 60 - 4 47 Thousands/µL    Monocytes Absolute 0 47 0 17 - 1 22 Thousand/µL    Eosinophils Absolute 0 04 0 00 - 0 61 Thousand/µL    Basophils Absolute 0 03 0 00 - 0 10 Thousands/µL   Comprehensive metabolic panel   Result Value Ref Range    Sodium 137 136 - 145 mmol/L    Potassium 4 3 3 5 - 5 3 mmol/L    Chloride 104 100 - 108 mmol/L    CO2 28 21 - 32 mmol/L    ANION GAP 5 4 - 13 mmol/L    BUN 13 5 - 25 mg/dL    Creatinine 1 57 (H) 0 60 - 1 30 mg/dL    Glucose 93 65 - 140 mg/dL    Calcium 9 1 8 3 - 10 1 mg/dL    AST 32 5 - 45 U/L    ALT 31 12 - 78 U/L    Alkaline Phosphatase 90 46 - 116 U/L    Total Protein 8 1 6 4 - 8 2 g/dL    Albumin 3 7 3 5 - 5 0 g/dL    Total Bilirubin 0 90 0 20 - 1 00 mg/dL    eGFR 63 ml/min/1 73sq m   Uric acid   Result Value Ref Range    Uric Acid 9 5 (H) 4 2 - 8 0 mg/dL

## 2019-07-16 ENCOUNTER — HOSPITAL ENCOUNTER (OUTPATIENT)
Dept: ULTRASOUND IMAGING | Facility: HOSPITAL | Age: 39
Discharge: HOME | End: 2019-07-16
Attending: INTERNAL MEDICINE
Payer: COMMERCIAL

## 2019-07-16 ENCOUNTER — APPOINTMENT (OUTPATIENT)
Dept: LAB | Facility: CLINIC | Age: 39
End: 2019-07-16
Payer: COMMERCIAL

## 2019-07-16 ENCOUNTER — TRANSCRIBE ORDERS (OUTPATIENT)
Dept: LAB | Facility: CLINIC | Age: 39
End: 2019-07-16

## 2019-07-16 DIAGNOSIS — E79.0 HYPERURICEMIA: ICD-10-CM

## 2019-07-16 DIAGNOSIS — R94.4 ABNORMAL RENAL FUNCTION TEST: ICD-10-CM

## 2019-07-16 LAB
ANION GAP SERPL CALCULATED.3IONS-SCNC: 8 MMOL/L (ref 4–13)
BACTERIA UR QL AUTO: NORMAL /HPF
BILIRUB UR QL STRIP: NEGATIVE
BUN SERPL-MCNC: 12 MG/DL (ref 5–25)
CALCIUM SERPL-MCNC: 9.3 MG/DL (ref 8.3–10.1)
CHLORIDE SERPL-SCNC: 102 MMOL/L (ref 100–108)
CLARITY UR: CLEAR
CO2 SERPL-SCNC: 30 MMOL/L (ref 21–32)
COLOR UR: YELLOW
CREAT SERPL-MCNC: 1.6 MG/DL (ref 0.6–1.3)
GFR SERPL CREATININE-BSD FRML MDRD: 61 ML/MIN/1.73SQ M
GLUCOSE SERPL-MCNC: 87 MG/DL (ref 65–140)
GLUCOSE UR STRIP-MCNC: NEGATIVE MG/DL
HGB UR QL STRIP.AUTO: NEGATIVE
KETONES UR STRIP-MCNC: NEGATIVE MG/DL
LEUKOCYTE ESTERASE UR QL STRIP: NEGATIVE
NITRITE UR QL STRIP: NEGATIVE
NON-SQ EPI CELLS URNS QL MICRO: NORMAL /HPF
PH UR STRIP.AUTO: 6 [PH]
POTASSIUM SERPL-SCNC: 4.4 MMOL/L (ref 3.5–5.3)
PROT UR STRIP-MCNC: NEGATIVE MG/DL
RBC #/AREA URNS AUTO: NORMAL /HPF
SODIUM SERPL-SCNC: 140 MMOL/L (ref 136–145)
SP GR UR STRIP.AUTO: 1.01 (ref 1–1.03)
URATE SERPL-MCNC: 8 MG/DL (ref 4.2–8)
URATE UR-MCNC: 15 MG/DL
UROBILINOGEN UR QL STRIP.AUTO: 0.2 E.U./DL
WBC #/AREA URNS AUTO: NORMAL /HPF

## 2019-07-16 PROCEDURE — 84560 ASSAY OF URINE/URIC ACID: CPT

## 2019-07-16 PROCEDURE — 82610 CYSTATIN C: CPT

## 2019-07-16 PROCEDURE — 81001 URINALYSIS AUTO W/SCOPE: CPT

## 2019-07-16 PROCEDURE — 84550 ASSAY OF BLOOD/URIC ACID: CPT

## 2019-07-16 PROCEDURE — 76770 US EXAM ABDO BACK WALL COMP: CPT

## 2019-07-16 PROCEDURE — 36415 COLL VENOUS BLD VENIPUNCTURE: CPT

## 2019-07-16 PROCEDURE — 80048 BASIC METABOLIC PNL TOTAL CA: CPT

## 2019-07-18 LAB — MISCELLANEOUS LAB TEST RESULT: NORMAL

## 2019-07-23 ENCOUNTER — TRANSCRIBE ORDERS (OUTPATIENT)
Dept: LAB | Facility: HOSPITAL | Age: 39
End: 2019-07-23

## 2019-07-23 DIAGNOSIS — R94.4 ABNORMAL RENAL FUNCTION TEST: Primary | ICD-10-CM

## 2019-07-25 ENCOUNTER — TRANSCRIBE ORDERS (OUTPATIENT)
Dept: LAB | Facility: HOSPITAL | Age: 39
End: 2019-07-25

## 2019-07-25 ENCOUNTER — TELEPHONE (OUTPATIENT)
Dept: NEPHROLOGY | Facility: CLINIC | Age: 39
End: 2019-07-25

## 2019-07-25 DIAGNOSIS — M10.9 GOUT INVOLVING TOE, UNSPECIFIED CAUSE, UNSPECIFIED CHRONICITY, UNSPECIFIED LATERALITY: Primary | ICD-10-CM

## 2019-07-25 RX ORDER — ALLOPURINOL 100 MG/1
200 TABLET ORAL DAILY
Qty: 90 TABLET | Refills: 3 | Status: SHIPPED | OUTPATIENT
Start: 2019-07-25 | End: 2021-10-06 | Stop reason: SDUPTHER

## 2019-07-25 NOTE — TELEPHONE ENCOUNTER
Called patient to update him on labs   Left voicemail    Will stop Uloric, and start Allopurnol instead

## 2019-07-25 NOTE — TELEPHONE ENCOUNTER
Spoke with Darnell Tejeda at [email or web address] about the prior Donnamaria Hail  She is not sure why it went to them when the patient's insurance, [employer], does their own prior authorizations  She gave me the number to [id number] and I spoke with Dorinda Rodriguez  A prior Donnamaria Hail was started  The number is [phone]  She said this will take 48-72 business hours  The due date is 7/29/2019 to hear if approved      The reference number is [id number]

## 2019-07-29 ENCOUNTER — APPOINTMENT (OUTPATIENT)
Dept: LAB | Facility: CLINIC | Age: 39
End: 2019-07-29
Payer: COMMERCIAL

## 2019-07-29 DIAGNOSIS — R94.4 ABNORMAL RENAL FUNCTION TEST: ICD-10-CM

## 2019-07-29 LAB
ALBUMIN SERPL BCP-MCNC: 3.4 G/DL (ref 3.5–5)
ALP SERPL-CCNC: 78 U/L (ref 46–116)
ALT SERPL W P-5'-P-CCNC: 32 U/L (ref 12–78)
ANION GAP SERPL CALCULATED.3IONS-SCNC: 9 MMOL/L (ref 4–13)
AST SERPL W P-5'-P-CCNC: 22 U/L (ref 5–45)
BILIRUB SERPL-MCNC: 0.4 MG/DL (ref 0.2–1)
BUN SERPL-MCNC: 14 MG/DL (ref 5–25)
CALCIUM SERPL-MCNC: 8.8 MG/DL (ref 8.3–10.1)
CHLORIDE SERPL-SCNC: 106 MMOL/L (ref 100–108)
CO2 SERPL-SCNC: 27 MMOL/L (ref 21–32)
CREAT 24H UR-MRATE: 2 G/24HR (ref 0.8–1.8)
CREAT CL 24H UR+SERPL-VRATE: 75.13 ML/MIN (ref 80–125)
CREAT SERPL-MCNC: 1.4 MG/DL (ref 0.6–1.3)
CREAT SERPL-MCNC: 1.45 MG/DL (ref 0.6–1.3)
CREAT UR-MCNC: 183 MG/DL
GFR SERPL CREATININE-BSD FRML MDRD: 69 ML/MIN/1.73SQ M
GLUCOSE SERPL-MCNC: 93 MG/DL (ref 65–140)
POTASSIUM SERPL-SCNC: 4.2 MMOL/L (ref 3.5–5.3)
PROT SERPL-MCNC: 7.2 G/DL (ref 6.4–8.2)
SODIUM SERPL-SCNC: 142 MMOL/L (ref 136–145)
SPECIMEN VOL UR: 1100 ML
URATE SERPL-MCNC: 8.5 MG/DL (ref 4.2–8)

## 2019-07-29 PROCEDURE — 82575 CREATININE CLEARANCE TEST: CPT

## 2019-07-29 PROCEDURE — 80053 COMPREHEN METABOLIC PANEL: CPT

## 2019-07-29 PROCEDURE — 36415 COLL VENOUS BLD VENIPUNCTURE: CPT

## 2019-07-29 PROCEDURE — 84550 ASSAY OF BLOOD/URIC ACID: CPT

## 2019-07-29 PROCEDURE — 82565 ASSAY OF CREATININE: CPT

## 2019-09-23 ENCOUNTER — TELEPHONE (OUTPATIENT)
Dept: NEPHROLOGY | Facility: CLINIC | Age: 39
End: 2019-09-23

## 2019-09-23 NOTE — TELEPHONE ENCOUNTER
I left a message for patient to schedule November follow up [facility]

## 2020-08-05 ENCOUNTER — HOSPITAL ENCOUNTER (EMERGENCY)
Facility: HOSPITAL | Age: 40
Discharge: HOME | End: 2020-08-05
Attending: EMERGENCY MEDICINE | Admitting: EMERGENCY MEDICINE
Payer: COMMERCIAL

## 2020-08-05 VITALS
DIASTOLIC BLOOD PRESSURE: 64 MMHG | HEART RATE: 88 BPM | SYSTOLIC BLOOD PRESSURE: 127 MMHG | TEMPERATURE: 98.3 F | RESPIRATION RATE: 20 BRPM | OXYGEN SATURATION: 97 % | WEIGHT: 213 LBS | BODY MASS INDEX: 28.1 KG/M2

## 2020-08-05 DIAGNOSIS — M10.371 ACUTE GOUT DUE TO RENAL IMPAIRMENT INVOLVING RIGHT FOOT: ICD-10-CM

## 2020-08-05 DIAGNOSIS — M10.9 GOUT: Primary | ICD-10-CM

## 2020-08-05 PROCEDURE — 99283 EMERGENCY DEPT VISIT LOW MDM: CPT

## 2020-08-05 PROCEDURE — 99284 EMERGENCY DEPT VISIT MOD MDM: CPT | Performed by: EMERGENCY MEDICINE

## 2020-08-05 RX ORDER — OXYCODONE HYDROCHLORIDE AND ACETAMINOPHEN 5; 325 MG/1; MG/1
1 TABLET ORAL ONCE
Status: COMPLETED | OUTPATIENT
Start: 2020-08-05 | End: 2020-08-05

## 2020-08-05 RX ORDER — PREDNISONE 20 MG/1
40 TABLET ORAL ONCE
Status: COMPLETED | OUTPATIENT
Start: 2020-08-05 | End: 2020-08-05

## 2020-08-05 RX ORDER — INDOMETHACIN 25 MG/1
50 CAPSULE ORAL ONCE
Status: COMPLETED | OUTPATIENT
Start: 2020-08-05 | End: 2020-08-05

## 2020-08-05 RX ORDER — PREDNISONE 10 MG/1
TABLET ORAL
Qty: 30 TABLET | Refills: 0 | Status: SHIPPED | OUTPATIENT
Start: 2020-08-05 | End: 2020-08-17

## 2020-08-05 RX ORDER — OXYCODONE HYDROCHLORIDE AND ACETAMINOPHEN 5; 325 MG/1; MG/1
1 TABLET ORAL EVERY 6 HOURS PRN
Qty: 12 TABLET | Refills: 0 | Status: SHIPPED | OUTPATIENT
Start: 2020-08-05 | End: 2020-08-15

## 2020-08-05 RX ORDER — INDOMETHACIN 50 MG/1
50 CAPSULE ORAL 2 TIMES DAILY WITH MEALS
Qty: 20 CAPSULE | Refills: 0 | Status: SHIPPED | OUTPATIENT
Start: 2020-08-05 | End: 2021-10-06 | Stop reason: ALTCHOICE

## 2020-08-05 RX ADMIN — OXYCODONE AND ACETAMINOPHEN 1 TABLET: 5; 325 TABLET ORAL at 14:42

## 2020-08-05 RX ADMIN — PREDNISONE 40 MG: 20 TABLET ORAL at 14:42

## 2020-08-05 RX ADMIN — INDOMETHACIN 50 MG: 25 CAPSULE ORAL at 14:41

## 2020-08-05 NOTE — ED PROVIDER NOTES
History  Chief Complaint   Patient presents with    Toe Pain     stubbed first toe on left foot last tues or wednesday  Has gotten progressively more swollen and more painful since  redness extends from toe to mid foot  edema of entire foot and ankle  hx of gout and "plantar problem"     33-year-old male comes in for foot swelling  Patient is wearing a boot for plantar fasciitis states the other night he had the boot off stubbed the front of his great toe and then began to have pain and swelling at the base of the great toe that extends up his leg it is red hot and swollen  At the joint  Patient does have a history of gout and states this is typically how his gout flares  He has not been taking his allopurinol  History provided by:  Patient   used: No    Toe Pain   Location:  Left great toe  Quality:  Throbbing  Severity:  Severe  Onset quality:  Gradual  Duration:  3 days  Timing:  Constant  Progression:  Worsening  Chronicity:  Recurrent  Context:  As above  Ineffective treatments:  None tried  Associated symptoms: no abdominal pain, no chest pain, no congestion, no cough, no headaches, no rash and no wheezing        Prior to Admission Medications   Prescriptions Last Dose Informant Patient Reported?  Taking?   allopurinol (ZYLOPRIM) 100 mg tablet Not Taking at Unknown time  No No   Sig: Take 2 tablets (200 mg total) by mouth daily   Patient not taking: Reported on 8/5/2020   betamethasone dipropionate (DIPROSONE) 0 05 % cream Not Taking at Unknown time Self No No   Sig: Apply topically 2 (two) times a day   Patient not taking: Reported on 6/14/2019   predniSONE 10 mg tablet Not Taking at Unknown time Self No No   Sig: Take 4 tablets daily for 4 days then 3 tablets daily for 3 days then 2 tablets daily for 2 days then 1 tablet daily for 1 day then stop   Patient not taking: Reported on 7/11/2019   predniSONE 10 mg tablet   No No   Sig: Take 4 tablets (40 mg total) by mouth daily for 3 days, THEN 3 tablets (30 mg total) daily for 3 days, THEN 2 tablets (20 mg total) daily for 3 days, THEN 1 tablet (10 mg total) daily for 3 days  Facility-Administered Medications: None       Past Medical History:   Diagnosis Date    Gout        Past Surgical History:   Procedure Laterality Date    APPENDECTOMY         Family History   Problem Relation Age of Onset    Diabetes Mother     No Known Problems Father      I have reviewed and agree with the history as documented  E-Cigarette/Vaping     E-Cigarette/Vaping Substances     Social History     Tobacco Use    Smoking status: Never Smoker    Smokeless tobacco: Never Used   Substance Use Topics    Alcohol use: No    Drug use: No       Review of Systems   Constitutional: Negative for activity change and appetite change  HENT: Negative for congestion and facial swelling  Eyes: Negative for discharge and redness  Respiratory: Negative for cough and wheezing  Cardiovascular: Negative for chest pain and leg swelling  Gastrointestinal: Negative for abdominal distention, abdominal pain and blood in stool  Endocrine: Negative for cold intolerance and polydipsia  Genitourinary: Negative for difficulty urinating and hematuria  Musculoskeletal: Negative for arthralgias and gait problem  Skin: Negative for color change and rash  Allergic/Immunologic: Negative for food allergies and immunocompromised state  Neurological: Negative for dizziness, seizures and headaches  Hematological: Negative for adenopathy  Does not bruise/bleed easily  Psychiatric/Behavioral: Negative for agitation, confusion and decreased concentration  All other systems reviewed and are negative  Physical Exam  Physical Exam  Vitals signs and nursing note reviewed  Constitutional:       Appearance: He is well-developed  He is not toxic-appearing  HENT:      Head: Normocephalic and atraumatic        Right Ear: Tympanic membrane normal       Left Ear: Tympanic membrane normal       Nose: Nose normal    Eyes:      General: Lids are normal       Conjunctiva/sclera: Conjunctivae normal       Pupils: Pupils are equal, round, and reactive to light  Neck:      Musculoskeletal: Normal range of motion and neck supple  Vascular: No carotid bruit or JVD  Trachea: Trachea normal    Cardiovascular:      Rate and Rhythm: Normal rate and regular rhythm  No extrasystoles are present  Heart sounds: Normal heart sounds  Pulmonary:      Effort: Pulmonary effort is normal       Breath sounds: No decreased breath sounds, wheezing, rhonchi or rales  Chest:      Chest wall: No deformity or tenderness  Abdominal:      General: Bowel sounds are normal       Palpations: Abdomen is soft  Tenderness: There is no abdominal tenderness  There is no guarding or rebound  Musculoskeletal:      Right shoulder: He exhibits normal range of motion, no tenderness, no swelling and no deformity  Cervical back: Normal  He exhibits normal range of motion, no tenderness, no bony tenderness and no deformity  Feet:    Feet:      Right foot:      Skin integrity: Erythema and warmth present  Lymphadenopathy:      Cervical: No cervical adenopathy  Skin:     General: Skin is warm and dry  Neurological:      Mental Status: He is alert and oriented to person, place, and time  Cranial Nerves: No cranial nerve deficit  Sensory: No sensory deficit  Deep Tendon Reflexes: Reflexes are normal and symmetric  Psychiatric:         Speech: Speech normal          Behavior: Behavior normal          Thought Content:  Thought content normal          Judgment: Judgment normal          Vital Signs  ED Triage Vitals   Temperature Pulse Respirations Blood Pressure SpO2   08/05/20 1338 08/05/20 1338 08/05/20 1338 08/05/20 1340 08/05/20 1338   98 3 °F (36 8 °C) 88 20 127/64 97 %      Temp Source Heart Rate Source Patient Position - Orthostatic VS BP Location FiO2 (%)   08/05/20 1338 08/05/20 1338 08/05/20 1338 08/05/20 1338 --   Oral Monitor Lying Left arm       Pain Score       --                  Vitals:    08/05/20 1338 08/05/20 1340   BP:  127/64   Pulse: 88    Patient Position - Orthostatic VS: Lying          Visual Acuity      ED Medications  Medications   indomethacin (INDOCIN) capsule 50 mg (has no administration in time range)   oxyCODONE-acetaminophen (PERCOCET) 5-325 mg per tablet 1 tablet (has no administration in time range)   predniSONE tablet 40 mg (has no administration in time range)       Diagnostic Studies  Results Reviewed     None                 No orders to display              Procedures  Procedures         ED Course       US AUDIT      Most Recent Value   Initial Alcohol Screen: US AUDIT-C    1  How often do you have a drink containing alcohol?  0 Filed at: 08/05/2020 1340   2  How many drinks containing alcohol do you have on a typical day you are drinking? 0 Filed at: 08/05/2020 1340   3a  Male UNDER 65: How often do you have five or more drinks on one occasion? 0 Filed at: 08/05/2020 1340   Audit-C Score  0 Filed at: 08/05/2020 1340                  LUIS CARLOS/DAST-10      Most Recent Value   How many times in the past year have you    Used an illegal drug or used a prescription medication for non-medical reasons?   Never Filed at: 08/05/2020 1340                                [facility]  Number of Diagnoses or Management Options  Gout: new and does not require workup  Patient Progress  Patient progress: stable        Disposition  Final diagnoses:   Gout     Time reflects when diagnosis was documented in both MDM as applicable and the Disposition within this note     Time User Action Codes Description Comment    8/5/2020  2:12 PM Ilene Rodriguez Add [M10 9] Gout     8/5/2020  2:12 PM Elizabeth MUNOZ Add [M10 371] Acute gout due to renal impairment involving right foot     8/5/2020  2:14 PM Ilene Rodriguez Modify [M10 371] Acute gout due to renal impairment involving right foot     8/5/2020  2:15 PM Benjiekeily Diego Modify [M10 371] Acute gout due to renal impairment involving right foot       ED Disposition     ED Disposition Condition Date/Time Comment    Discharge Stable Wed Aug 5, 2020  2:12 PM Chloe Childs discharge to home/self care  Follow-up Information     Follow up With Specialties Details Why [address]  Internal Medicine, Nurse Practitioner Schedule an appointment as soon as possible for a visit   Miguel Peterson [address] [place] [address]  [phone]            Patient's Medications   Discharge Prescriptions    INDOMETHACIN (INDOCIN) 50 MG CAPSULE    Take 1 capsule (50 mg total) by mouth 2 (two) times a day with meals       Start Date: 8/5/2020  End Date: --       Order Dose: 50 mg       Quantity: 20 capsule    Refills: 0    OXYCODONE-ACETAMINOPHEN (PERCOCET) 5-325 MG PER TABLET    Take 1 tablet by mouth every 6 (six) hours as needed for severe pain for up to 10 daysMax Daily Amount: 4 tablets       Start Date: 8/5/2020  End Date: 8/15/2020       Order Dose: 1 tablet       Quantity: 12 tablet    Refills: 0     No discharge procedures on file      PDMP Review     None          ED Provider  Electronically Signed by           Mario Amaya DO  08/05/20 2825

## 2020-08-07 ENCOUNTER — VBI (OUTPATIENT)
Dept: INTERNAL MEDICINE CLINIC | Facility: CLINIC | Age: 40
End: 2020-08-07

## 2020-08-07 NOTE — TELEPHONE ENCOUNTER
Coni Donovan    ED Visit Information     Ed visit date: 8/5/20  Diagnosis Description: Gout  In Network? Yes [address] [facility]  Discharge status: Home  Discharged with meds ? Yes  Number of ED visits to date: 1  ED Severity:N/A     Outreach Information    Outreach successful: No 2  Date letter mailed:unable to mail letter due to pandemic  Date Finalized:8/10/20    Care Coordination    Follow up appointment with pcp: no PCP appt  Transportation issues ?  NA    Value [facility] type:  3 Day Outreach  08/07/2020 11:35 AM Phone (VBI) Michael Martel (Self) [phone] (M) Remove  Left Message - attempt 1   By Yadira Camacho    08/10/2020 01:55 PM Phone ([employer]) Michael Martel (Self) [phone] (M) Remove  Left Message - attempt 2  By Yadira Camacho

## 2021-08-19 ENCOUNTER — HOSPITAL ENCOUNTER (EMERGENCY)
Facility: HOSPITAL | Age: 41
Discharge: HOME | End: 2021-08-19
Attending: EMERGENCY MEDICINE
Payer: COMMERCIAL

## 2021-08-19 VITALS
SYSTOLIC BLOOD PRESSURE: 154 MMHG | WEIGHT: 199.96 LBS | BODY MASS INDEX: 26.38 KG/M2 | DIASTOLIC BLOOD PRESSURE: 72 MMHG | OXYGEN SATURATION: 100 % | RESPIRATION RATE: 18 BRPM | HEART RATE: 77 BPM | TEMPERATURE: 98.5 F

## 2021-08-19 DIAGNOSIS — M10.9 GOUT ATTACK: Primary | ICD-10-CM

## 2021-08-19 PROCEDURE — 99284 EMERGENCY DEPT VISIT MOD MDM: CPT | Performed by: EMERGENCY MEDICINE

## 2021-08-19 PROCEDURE — 99283 EMERGENCY DEPT VISIT LOW MDM: CPT

## 2021-08-19 RX ORDER — PREDNISONE 10 MG/1
TABLET ORAL
Qty: 63 TABLET | Refills: 0 | Status: SHIPPED | OUTPATIENT
Start: 2021-08-19 | End: 2021-08-31

## 2021-08-19 RX ORDER — OXYCODONE HYDROCHLORIDE AND ACETAMINOPHEN 5; 325 MG/1; MG/1
1 TABLET ORAL EVERY 6 HOURS PRN
Qty: 12 TABLET | Refills: 0 | Status: SHIPPED | OUTPATIENT
Start: 2021-08-19 | End: 2021-08-29

## 2021-08-19 RX ORDER — OXYCODONE HYDROCHLORIDE AND ACETAMINOPHEN 5; 325 MG/1; MG/1
1 TABLET ORAL ONCE
Status: COMPLETED | OUTPATIENT
Start: 2021-08-19 | End: 2021-08-19

## 2021-08-19 RX ORDER — INDOMETHACIN 25 MG/1
25 CAPSULE ORAL ONCE
Status: COMPLETED | OUTPATIENT
Start: 2021-08-19 | End: 2021-08-19

## 2021-08-19 RX ORDER — PREDNISONE 20 MG/1
60 TABLET ORAL ONCE
Status: COMPLETED | OUTPATIENT
Start: 2021-08-19 | End: 2021-08-19

## 2021-08-19 RX ORDER — INDOMETHACIN 25 MG/1
25 CAPSULE ORAL 3 TIMES DAILY PRN
Qty: 30 CAPSULE | Refills: 0 | Status: SHIPPED | OUTPATIENT
Start: 2021-08-19 | End: 2021-10-06 | Stop reason: ALTCHOICE

## 2021-08-19 RX ADMIN — OXYCODONE HYDROCHLORIDE AND ACETAMINOPHEN 1 TABLET: 5; 325 TABLET ORAL at 10:02

## 2021-08-19 RX ADMIN — INDOMETHACIN 25 MG: 25 CAPSULE ORAL at 10:02

## 2021-08-19 RX ADMIN — PREDNISONE 60 MG: 20 TABLET ORAL at 10:01

## 2021-08-19 NOTE — ED PROVIDER NOTES
History  Chief Complaint   Patient presents with    Gout Pain     pt reports gout pain in right foot beginning last week and progressively worsening  pt took oxycodone last night for pain     41year-old gentleman comes in for evaluation of foot pain  Patient has a known history of gout as well as plantar fasciitis  He began to have some pain is foot and thought it was just his plantar fasciitis flaring up however now his great toe became red hot and swollen and now the swelling is extending into his ankle  Patient states that he tried to manage it at home with over the counter pain medications however he states that the swelling has gotten so bad he is now unable to bear weight on that foot  Ankle Swelling  Location:  Toe, foot and ankle  Time since incident:  4 days  Injury: no    Ankle location:  R ankle  Foot location:  R foot  Toe location:  R great toe  Pain details:     Quality:  Pressure, shooting and throbbing    Radiates to:  Does not radiate    Severity:  Severe    Onset quality:  Gradual    Timing:  Constant    Progression:  Worsening  Chronicity:  Recurrent  Dislocation: no    Foreign body present:  No foreign bodies  Ineffective treatments:  Acetaminophen, NSAIDs and immobilization  Associated symptoms: decreased ROM and swelling    Associated symptoms: no back pain    Risk factors: no concern for non-accidental trauma and no recent illness        Prior to Admission Medications   Prescriptions Last Dose Informant Patient Reported?  Taking?   allopurinol (ZYLOPRIM) 100 mg tablet   No No   Sig: Take 2 tablets (200 mg total) by mouth daily   Patient not taking: Reported on 8/5/2020   betamethasone dipropionate (DIPROSONE) 0 05 % cream  Self No No   Sig: Apply topically 2 (two) times a day   Patient not taking: Reported on 6/14/2019   indomethacin (INDOCIN) 50 mg capsule   No No   Sig: Take 1 capsule (50 mg total) by mouth 2 (two) times a day with meals      Facility-Administered Medications: None Past Medical History:   Diagnosis Date    Gout        Past Surgical History:   Procedure Laterality Date    APPENDECTOMY         Family History   Problem Relation Age of Onset    Diabetes Mother     No Known Problems Father      I have reviewed and agree with the history as documented  E-Cigarette/Vaping     E-Cigarette/Vaping Substances     Social History     Tobacco Use    Smoking status: Never Smoker    Smokeless tobacco: Never Used   Substance Use Topics    Alcohol use: No    Drug use: No       Review of Systems   Constitutional: Negative for activity change and appetite change  HENT: Negative for congestion and facial swelling  Eyes: Negative for discharge and redness  Respiratory: Negative for cough and wheezing  Cardiovascular: Negative for chest pain and leg swelling  Gastrointestinal: Negative for abdominal distention, abdominal pain and blood in stool  Endocrine: Negative for cold intolerance and polydipsia  Genitourinary: Negative for difficulty urinating and hematuria  Musculoskeletal: Negative for arthralgias, back pain and gait problem  Skin: Negative for color change and rash  Allergic/Immunologic: Negative for food allergies and immunocompromised state  Neurological: Negative for dizziness, seizures and headaches  Hematological: Negative for adenopathy  Does not bruise/bleed easily  Psychiatric/Behavioral: Negative for agitation, confusion and decreased concentration  All other systems reviewed and are negative  Physical Exam  Physical Exam  Vitals and nursing note reviewed  Constitutional:       Appearance: He is well-developed  He is not toxic-appearing  HENT:      Head: Normocephalic and atraumatic        Right Ear: Tympanic membrane normal       Left Ear: Tympanic membrane normal       Nose: Nose normal    Eyes:      General: Lids are normal       Conjunctiva/sclera: Conjunctivae normal       Pupils: Pupils are equal, round, and reactive to light    Neck:      Vascular: No carotid bruit or JVD  Trachea: Trachea normal    Cardiovascular:      Rate and Rhythm: Normal rate and regular rhythm  No extrasystoles are present  Heart sounds: Normal heart sounds  Pulmonary:      Effort: Pulmonary effort is normal       Breath sounds: No decreased breath sounds, wheezing, rhonchi or rales  Chest:      Chest wall: No deformity or tenderness  Abdominal:      General: Bowel sounds are normal       Palpations: Abdomen is soft  Tenderness: There is no abdominal tenderness  There is no guarding or rebound  Musculoskeletal:      Right shoulder: No swelling, deformity or tenderness  Normal range of motion  Cervical back: Normal range of motion and neck supple  No deformity, tenderness or bony tenderness  Lymphadenopathy:      Cervical: No cervical adenopathy  Skin:     General: Skin is warm and dry  Neurological:      Mental Status: He is alert and oriented to person, place, and time  Cranial Nerves: No cranial nerve deficit  Sensory: No sensory deficit  Deep Tendon Reflexes: Reflexes are normal and symmetric  Psychiatric:         Speech: Speech normal          Behavior: Behavior normal          Thought Content:  Thought content normal          Judgment: Judgment normal          Vital Signs  ED Triage Vitals [08/19/21 0950]   Temperature Pulse Respirations Blood Pressure SpO2   98 5 °F (36 9 °C) 77 18 154/72 100 %      Temp Source Heart Rate Source Patient Position - Orthostatic VS BP Location FiO2 (%)   Oral Monitor -- -- --      Pain Score       6           Vitals:    08/19/21 0950   BP: 154/72   Pulse: 77         Visual Acuity      ED Medications  Medications   indomethacin (INDOCIN) capsule 25 mg (25 mg Oral Given 8/19/21 1002)   oxyCODONE-acetaminophen (PERCOCET) 5-325 mg per tablet 1 tablet (1 tablet Oral Given 8/19/21 1002)   predniSONE tablet 60 mg (60 mg Oral Given 8/19/21 1001)       Diagnostic Studies  Results Reviewed     None                 No orders to display              Procedures  Procedures         ED Course                                           MDM  Number of Diagnoses or Management Options  Gout attack: new and does not require workup  Patient Progress  Patient progress: stable      Disposition  Final diagnoses:   Gout attack     Time reflects when diagnosis was documented in both MDM as applicable and the Disposition within this note     Time User Action Codes Description Comment    8/19/2021  9:57 AM Uma Bertrand Add [M10 9] Gout attack       ED Disposition     ED Disposition Condition Date/Time Comment    Discharge Stable Thu Aug 19, 2021  9:57 AM Emilee Brandon discharge to home/self care              Follow-up Information     Follow up With Specialties Details Why Contact Info Additional Information    [address] [facility] Schedule an appointment as soon as possible for a visit   [address] [id number]  [id number] , [place], [place], [address]          Patient's Medications   Discharge Prescriptions    INDOMETHACIN (INDOCIN) 25 MG CAPSULE    Take 1 capsule (25 mg total) by mouth 3 (three) times a day as needed for mild pain       Start Date: 8/19/2021 End Date: --       Order Dose: 25 mg       Quantity: 30 capsule    Refills: 0    OXYCODONE-ACETAMINOPHEN (PERCOCET) 5-325 MG PER TABLET    Take 1 tablet by mouth every 6 (six) hours as needed for severe pain for up to 10 daysMax Daily Amount: 4 tablets       Start Date: 8/19/2021 End Date: 8/29/2021       Order Dose: 1 tablet       Quantity: 12 tablet    Refills: 0    PREDNISONE 10 MG TABLET    Take 6 tablets (60 mg total) by mouth daily for 2 days, THEN 5 tablets (50 mg total) daily for 2 days, THEN 4 tablets (40 mg total) daily for 2 days, THEN 3 tablets (30 mg total) daily for 2 days, THEN 2 tablets (20 mg total) daily for 2 days, THEN 1 tablet (10 mg total) daily for 2 days  6 tabs for 3 days and then decrease by one tab every 3 days until complete  Start Date: 8/19/2021 End Date: 8/31/2021       Order Dose: --       Quantity: 63 tablet    Refills: 0     No discharge procedures on file      PDMP Review     None          ED Provider  Electronically Signed by           Ada Connor,   08/19/21 [facility],   08/19/21 3526

## 2021-10-05 ENCOUNTER — HOSPITAL ENCOUNTER (EMERGENCY)
Facility: HOSPITAL | Age: 41
Discharge: HOME | End: 2021-10-05
Attending: EMERGENCY MEDICINE | Admitting: EMERGENCY MEDICINE
Payer: COMMERCIAL

## 2021-10-05 VITALS
OXYGEN SATURATION: 99 % | TEMPERATURE: 98.4 F | SYSTOLIC BLOOD PRESSURE: 126 MMHG | DIASTOLIC BLOOD PRESSURE: 78 MMHG | HEART RATE: 81 BPM | RESPIRATION RATE: 16 BRPM

## 2021-10-05 DIAGNOSIS — M10.9 ACUTE GOUTY ARTHRITIS: Primary | ICD-10-CM

## 2021-10-05 LAB
ANION GAP SERPL CALCULATED.3IONS-SCNC: 9 MMOL/L (ref 4–13)
BASOPHILS # BLD AUTO: 0.03 THOUSANDS/ΜL (ref 0–0.1)
BASOPHILS NFR BLD AUTO: 1 % (ref 0–1)
BUN SERPL-MCNC: 13 MG/DL (ref 5–25)
CALCIUM SERPL-MCNC: 9 MG/DL (ref 8.3–10.1)
CHLORIDE SERPL-SCNC: 102 MMOL/L (ref 100–108)
CO2 SERPL-SCNC: 27 MMOL/L (ref 21–32)
CREAT SERPL-MCNC: 1.48 MG/DL (ref 0.6–1.3)
CRP SERPL QL: 87.8 MG/L
EOSINOPHIL # BLD AUTO: 0.05 THOUSAND/ΜL (ref 0–0.61)
EOSINOPHIL NFR BLD AUTO: 1 % (ref 0–6)
ERYTHROCYTE [DISTWIDTH] IN BLOOD BY AUTOMATED COUNT: 13.2 % (ref 11.6–15.1)
GFR SERPL CREATININE-BSD FRML MDRD: 67 ML/MIN/1.73SQ M
GLUCOSE SERPL-MCNC: 97 MG/DL (ref 65–140)
HCT VFR BLD AUTO: 47.5 % (ref 36.5–49.3)
HGB BLD-MCNC: 15.6 G/DL (ref 12–17)
IMM GRANULOCYTES # BLD AUTO: 0.03 THOUSAND/UL (ref 0–0.2)
IMM GRANULOCYTES NFR BLD AUTO: 1 % (ref 0–2)
LYMPHOCYTES # BLD AUTO: 0.89 THOUSANDS/ΜL (ref 0.6–4.47)
LYMPHOCYTES NFR BLD AUTO: 16 % (ref 14–44)
MCH RBC QN AUTO: 28.3 PG (ref 26.8–34.3)
MCHC RBC AUTO-ENTMCNC: 32.8 G/DL (ref 31.4–37.4)
MCV RBC AUTO: 86 FL (ref 82–98)
MONOCYTES # BLD AUTO: 0.53 THOUSAND/ΜL (ref 0.17–1.22)
MONOCYTES NFR BLD AUTO: 10 % (ref 4–12)
NEUTROPHILS # BLD AUTO: 3.97 THOUSANDS/ΜL (ref 1.85–7.62)
NEUTS SEG NFR BLD AUTO: 71 % (ref 43–75)
NRBC BLD AUTO-RTO: 0 /100 WBCS
PLATELET # BLD AUTO: 214 THOUSANDS/UL (ref 149–390)
PMV BLD AUTO: 10.8 FL (ref 8.9–12.7)
POTASSIUM SERPL-SCNC: 4.9 MMOL/L (ref 3.5–5.3)
RBC # BLD AUTO: 5.51 MILLION/UL (ref 3.88–5.62)
SODIUM SERPL-SCNC: 138 MMOL/L (ref 136–145)
URATE SERPL-MCNC: 9.4 MG/DL (ref 4.2–8)
WBC # BLD AUTO: 5.5 THOUSAND/UL (ref 4.31–10.16)

## 2021-10-05 PROCEDURE — 85025 COMPLETE CBC W/AUTO DIFF WBC: CPT

## 2021-10-05 PROCEDURE — 99283 EMERGENCY DEPT VISIT LOW MDM: CPT

## 2021-10-05 PROCEDURE — 36415 COLL VENOUS BLD VENIPUNCTURE: CPT

## 2021-10-05 PROCEDURE — 84550 ASSAY OF BLOOD/URIC ACID: CPT

## 2021-10-05 PROCEDURE — 86140 C-REACTIVE PROTEIN: CPT

## 2021-10-05 PROCEDURE — 80048 BASIC METABOLIC PNL TOTAL CA: CPT

## 2021-10-05 PROCEDURE — 99284 EMERGENCY DEPT VISIT MOD MDM: CPT | Performed by: EMERGENCY MEDICINE

## 2021-10-05 RX ORDER — OXYCODONE HYDROCHLORIDE AND ACETAMINOPHEN 5; 325 MG/1; MG/1
1 TABLET ORAL EVERY 4 HOURS PRN
Qty: 15 TABLET | Refills: 0 | Status: SHIPPED | OUTPATIENT
Start: 2021-10-05 | End: 2021-10-06 | Stop reason: ALTCHOICE

## 2021-10-05 RX ORDER — METHYLPREDNISOLONE 4 MG/1
TABLET ORAL
Qty: 21 TABLET | Refills: 0 | Status: SHIPPED | OUTPATIENT
Start: 2021-10-05

## 2021-10-05 RX ORDER — OXYCODONE HYDROCHLORIDE AND ACETAMINOPHEN 5; 325 MG/1; MG/1
1 TABLET ORAL ONCE
Status: COMPLETED | OUTPATIENT
Start: 2021-10-05 | End: 2021-10-05

## 2021-10-05 RX ADMIN — OXYCODONE HYDROCHLORIDE AND ACETAMINOPHEN 1 TABLET: 5; 325 TABLET ORAL at 11:31

## 2021-10-06 ENCOUNTER — OFFICE VISIT (OUTPATIENT)
Dept: INTERNAL MEDICINE CLINIC | Facility: CLINIC | Age: 41
End: 2021-10-06
Payer: COMMERCIAL

## 2021-10-06 VITALS
RESPIRATION RATE: 20 BRPM | BODY MASS INDEX: 27.06 KG/M2 | TEMPERATURE: 97 F | SYSTOLIC BLOOD PRESSURE: 120 MMHG | WEIGHT: 204.2 LBS | HEIGHT: 73 IN | OXYGEN SATURATION: 97 % | DIASTOLIC BLOOD PRESSURE: 82 MMHG | HEART RATE: 86 BPM

## 2021-10-06 DIAGNOSIS — N18.2 STAGE 2 CHRONIC KIDNEY DISEASE: ICD-10-CM

## 2021-10-06 DIAGNOSIS — M25.474 BILATERAL SWELLING OF FEET AND ANKLES: ICD-10-CM

## 2021-10-06 DIAGNOSIS — M25.472 BILATERAL SWELLING OF FEET AND ANKLES: ICD-10-CM

## 2021-10-06 DIAGNOSIS — M1A.9XX0 CHRONIC GOUT WITHOUT TOPHUS, UNSPECIFIED CAUSE, UNSPECIFIED SITE: Primary | ICD-10-CM

## 2021-10-06 DIAGNOSIS — M25.475 BILATERAL SWELLING OF FEET AND ANKLES: ICD-10-CM

## 2021-10-06 DIAGNOSIS — M10.9 GOUT INVOLVING TOE, UNSPECIFIED CAUSE, UNSPECIFIED CHRONICITY, UNSPECIFIED LATERALITY: ICD-10-CM

## 2021-10-06 DIAGNOSIS — M25.471 BILATERAL SWELLING OF FEET AND ANKLES: ICD-10-CM

## 2021-10-06 PROBLEM — R94.4 ABNORMAL RENAL FUNCTION TEST: Status: RESOLVED | Noted: 2019-07-11 | Resolved: 2021-10-06

## 2021-10-06 PROBLEM — M1A.3790: Status: RESOLVED | Noted: 2019-06-26 | Resolved: 2021-10-06

## 2021-10-06 PROBLEM — M1A.0710 IDIOPATHIC CHRONIC GOUT OF RIGHT FOOT: Status: RESOLVED | Noted: 2019-07-11 | Resolved: 2021-10-06

## 2021-10-06 PROCEDURE — 3008F BODY MASS INDEX DOCD: CPT | Performed by: INTERNAL MEDICINE

## 2021-10-06 PROCEDURE — 3725F SCREEN DEPRESSION PERFORMED: CPT | Performed by: INTERNAL MEDICINE

## 2021-10-06 PROCEDURE — 1036F TOBACCO NON-USER: CPT | Performed by: INTERNAL MEDICINE

## 2021-10-06 PROCEDURE — 99214 OFFICE O/P EST MOD 30 MIN: CPT | Performed by: INTERNAL MEDICINE

## 2021-10-06 RX ORDER — ALLOPURINOL 100 MG/1
200 TABLET ORAL DAILY
Qty: 90 TABLET | Refills: 3 | Status: SHIPPED | OUTPATIENT
Start: 2021-10-06 | End: 2022-04-11

## 2021-10-07 ENCOUNTER — LAB (OUTPATIENT)
Dept: LAB | Facility: CLINIC | Age: 41
End: 2021-10-07
Payer: COMMERCIAL

## 2021-10-07 LAB
BILIRUB UR QL STRIP: NEGATIVE
CHOLEST SERPL-MCNC: 192 MG/DL (ref 50–200)
CLARITY UR: CLEAR
COLOR UR: YELLOW
EST. AVERAGE GLUCOSE BLD GHB EST-MCNC: 117 MG/DL
GLUCOSE UR STRIP-MCNC: NEGATIVE MG/DL
HBA1C MFR BLD: 5.7 %
HDLC SERPL-MCNC: 45 MG/DL
HGB UR QL STRIP.AUTO: NEGATIVE
KETONES UR STRIP-MCNC: NEGATIVE MG/DL
LDLC SERPL CALC-MCNC: 138 MG/DL (ref 0–100)
LEUKOCYTE ESTERASE UR QL STRIP: NEGATIVE
NITRITE UR QL STRIP: NEGATIVE
PH UR STRIP.AUTO: 5.5 [PH]
PROT UR STRIP-MCNC: NEGATIVE MG/DL
SP GR UR STRIP.AUTO: 1.02 (ref 1–1.03)
TRIGL SERPL-MCNC: 43 MG/DL
UROBILINOGEN UR QL STRIP.AUTO: 0.2 E.U./DL

## 2021-10-07 PROCEDURE — 81003 URINALYSIS AUTO W/O SCOPE: CPT | Performed by: INTERNAL MEDICINE

## 2021-10-07 PROCEDURE — 36415 COLL VENOUS BLD VENIPUNCTURE: CPT

## 2021-10-07 PROCEDURE — 80061 LIPID PANEL: CPT

## 2021-10-07 PROCEDURE — 83036 HEMOGLOBIN GLYCOSYLATED A1C: CPT

## 2022-04-08 DIAGNOSIS — M10.9 GOUT INVOLVING TOE, UNSPECIFIED CAUSE, UNSPECIFIED CHRONICITY, UNSPECIFIED LATERALITY: ICD-10-CM

## 2022-04-08 NOTE — TELEPHONE ENCOUNTER
Stephanie Graf has requested a refill of allopurinol (ZYLOPRIM) 100 mg tablet  [facility]  Will a refill be appropriate? Normal rate, regular rhythm, normal S1, S2 heart sounds heard.

## 2022-04-11 RX ORDER — ALLOPURINOL 100 MG/1
TABLET ORAL
Qty: 60 TABLET | Refills: 5 | Status: SHIPPED | OUTPATIENT
Start: 2022-04-11

## 2022-09-28 DIAGNOSIS — M10.9 GOUT INVOLVING TOE, UNSPECIFIED CAUSE, UNSPECIFIED CHRONICITY, UNSPECIFIED LATERALITY: ICD-10-CM

## 2022-09-28 RX ORDER — ALLOPURINOL 100 MG/1
TABLET ORAL
Qty: 180 TABLET | Refills: 2 | OUTPATIENT
Start: 2022-09-28

## 2022-09-28 NOTE — TELEPHONE ENCOUNTER
Xavier Saba has requested a 90 day supply of allopurinol (ZYLOPRIM) 100 mg tablet  Pt needs an appointment before refill can be ordered   Will refuse refill request

## 2022-11-06 DIAGNOSIS — M10.9 GOUT INVOLVING TOE, UNSPECIFIED CAUSE, UNSPECIFIED CHRONICITY, UNSPECIFIED LATERALITY: ICD-10-CM

## 2022-11-07 RX ORDER — ALLOPURINOL 100 MG/1
TABLET ORAL
Qty: 180 TABLET | Refills: 2 | OUTPATIENT
Start: 2022-11-07

## 2022-11-07 NOTE — TELEPHONE ENCOUNTER
Polo Rob has requested a refill of allopurinol (ZYLOPRIM) 100 mg tablet  Pt needs an appointment with the [address] [place] [address] Road  Has not had a valid encounter with the IM office within the last 12 months   Will refuse refill request

## 2022-12-08 DIAGNOSIS — M10.9 GOUT INVOLVING TOE, UNSPECIFIED CAUSE, UNSPECIFIED CHRONICITY, UNSPECIFIED LATERALITY: ICD-10-CM

## 2022-12-14 ENCOUNTER — TELEPHONE (OUTPATIENT)
Dept: INTERNAL MEDICINE CLINIC | Facility: CLINIC | Age: 42
End: 2022-12-14

## 2022-12-14 RX ORDER — ALLOPURINOL 100 MG/1
TABLET ORAL
Qty: 180 TABLET | Refills: 1 | OUTPATIENT
Start: 2022-12-14

## 2022-12-14 NOTE — TELEPHONE ENCOUNTER
Received refill request for [place] allpurinol (ZYLOPRIM) 100 mg tablet  Starr Garcia was last seen by the [facility] office on 10/06/2021  L/m to call back to schedule an appointment

## 2022-12-14 NOTE — TELEPHONE ENCOUNTER
Mary Jane Pelaez has requested a refill of allopurinol (ZYLOPRIM) 100 mg tablet  Pt needs an appointment with the IM office   L/m to Maria T to schedule an appointment with the IM Office and will refuse refill request

## 2024-05-16 ENCOUNTER — HOSPITAL ENCOUNTER (EMERGENCY)
Facility: HOSPITAL | Age: 44
Discharge: HOME | End: 2024-05-16
Attending: EMERGENCY MEDICINE | Admitting: EMERGENCY MEDICINE
Payer: COMMERCIAL

## 2024-05-16 VITALS
WEIGHT: 203.48 LBS | TEMPERATURE: 97.8 F | HEART RATE: 65 BPM | RESPIRATION RATE: 18 BRPM | OXYGEN SATURATION: 99 % | DIASTOLIC BLOOD PRESSURE: 76 MMHG | BODY MASS INDEX: 27.22 KG/M2 | SYSTOLIC BLOOD PRESSURE: 116 MMHG

## 2024-05-16 DIAGNOSIS — M79.89 SWELLING OF RIGHT LOWER EXTREMITY: ICD-10-CM

## 2024-05-16 DIAGNOSIS — M70.41 PREPATELLAR BURSITIS OF RIGHT KNEE: ICD-10-CM

## 2024-05-16 DIAGNOSIS — M10.9 ACUTE GOUT: ICD-10-CM

## 2024-05-16 DIAGNOSIS — M79.89 SWELLING OF LEFT LOWER EXTREMITY: Primary | ICD-10-CM

## 2024-05-16 LAB
ALBUMIN SERPL BCP-MCNC: 3.6 G/DL (ref 3.5–5)
ALP SERPL-CCNC: 63 U/L (ref 34–104)
ALT SERPL W P-5'-P-CCNC: 10 U/L (ref 7–52)
ANION GAP SERPL CALCULATED.3IONS-SCNC: 8 MMOL/L (ref 4–13)
AST SERPL W P-5'-P-CCNC: 12 U/L (ref 13–39)
BASOPHILS # BLD AUTO: 0.04 THOUSANDS/ÂΜL (ref 0–0.1)
BASOPHILS NFR BLD AUTO: 1 % (ref 0–1)
BILIRUB SERPL-MCNC: 0.54 MG/DL (ref 0.2–1)
BUN SERPL-MCNC: 15 MG/DL (ref 5–25)
CALCIUM SERPL-MCNC: 9 MG/DL (ref 8.4–10.2)
CHLORIDE SERPL-SCNC: 105 MMOL/L (ref 96–108)
CO2 SERPL-SCNC: 26 MMOL/L (ref 21–32)
CREAT SERPL-MCNC: 1.39 MG/DL (ref 0.6–1.3)
CRP SERPL QL: 36.9 MG/L
EOSINOPHIL # BLD AUTO: 0.09 THOUSAND/ÂΜL (ref 0–0.61)
EOSINOPHIL NFR BLD AUTO: 2 % (ref 0–6)
ERYTHROCYTE [DISTWIDTH] IN BLOOD BY AUTOMATED COUNT: 13 % (ref 11.6–15.1)
GFR SERPL CREATININE-BSD FRML MDRD: 60 ML/MIN/1.73SQ M
GLUCOSE SERPL-MCNC: 91 MG/DL (ref 65–140)
HCT VFR BLD AUTO: 42.7 % (ref 36.5–49.3)
HGB BLD-MCNC: 13.8 G/DL (ref 12–17)
IMM GRANULOCYTES # BLD AUTO: 0.03 THOUSAND/UL (ref 0–0.2)
IMM GRANULOCYTES NFR BLD AUTO: 1 % (ref 0–2)
LYMPHOCYTES # BLD AUTO: 0.95 THOUSANDS/ÂΜL (ref 0.6–4.47)
LYMPHOCYTES NFR BLD AUTO: 25 % (ref 14–44)
MCH RBC QN AUTO: 27.4 PG (ref 26.8–34.3)
MCHC RBC AUTO-ENTMCNC: 32.3 G/DL (ref 31.4–37.4)
MCV RBC AUTO: 85 FL (ref 82–98)
MONOCYTES # BLD AUTO: 0.28 THOUSAND/ÂΜL (ref 0.17–1.22)
MONOCYTES NFR BLD AUTO: 7 % (ref 4–12)
NEUTROPHILS # BLD AUTO: 2.47 THOUSANDS/ÂΜL (ref 1.85–7.62)
NEUTS SEG NFR BLD AUTO: 64 % (ref 43–75)
NRBC BLD AUTO-RTO: 0 /100 WBCS
PLATELET # BLD AUTO: 236 THOUSANDS/UL (ref 149–390)
PMV BLD AUTO: 10.2 FL (ref 8.9–12.7)
POTASSIUM SERPL-SCNC: 3.9 MMOL/L (ref 3.5–5.3)
PROT SERPL-MCNC: 7.4 G/DL (ref 6.4–8.4)
RBC # BLD AUTO: 5.04 MILLION/UL (ref 3.88–5.62)
SODIUM SERPL-SCNC: 139 MMOL/L (ref 135–147)
URATE SERPL-MCNC: 8.9 MG/DL (ref 3.5–8.5)
WBC # BLD AUTO: 3.86 THOUSAND/UL (ref 4.31–10.16)

## 2024-05-16 PROCEDURE — 84550 ASSAY OF BLOOD/URIC ACID: CPT | Performed by: EMERGENCY MEDICINE

## 2024-05-16 PROCEDURE — 86140 C-REACTIVE PROTEIN: CPT | Performed by: EMERGENCY MEDICINE

## 2024-05-16 PROCEDURE — 99284 EMERGENCY DEPT VISIT MOD MDM: CPT | Performed by: EMERGENCY MEDICINE

## 2024-05-16 PROCEDURE — 80053 COMPREHEN METABOLIC PANEL: CPT | Performed by: EMERGENCY MEDICINE

## 2024-05-16 PROCEDURE — 36415 COLL VENOUS BLD VENIPUNCTURE: CPT | Performed by: EMERGENCY MEDICINE

## 2024-05-16 PROCEDURE — 85025 COMPLETE CBC W/AUTO DIFF WBC: CPT | Performed by: EMERGENCY MEDICINE

## 2024-05-16 PROCEDURE — 99283 EMERGENCY DEPT VISIT LOW MDM: CPT

## 2024-05-16 RX ORDER — ACETAMINOPHEN 325 MG/1
975 TABLET ORAL ONCE
Status: COMPLETED | OUTPATIENT
Start: 2024-05-16 | End: 2024-05-16

## 2024-05-16 RX ORDER — PREDNISONE 10 MG/1
TABLET ORAL
Qty: 25 TABLET | Refills: 0 | Status: SHIPPED | OUTPATIENT
Start: 2024-05-17 | End: 2024-05-27

## 2024-05-16 RX ORDER — PREDNISONE 20 MG/1
40 TABLET ORAL ONCE
Status: COMPLETED | OUTPATIENT
Start: 2024-05-16 | End: 2024-05-16

## 2024-05-16 RX ADMIN — PREDNISONE 40 MG: 20 TABLET ORAL at 12:31

## 2024-05-16 RX ADMIN — ACETAMINOPHEN 975 MG: 325 TABLET, FILM COATED ORAL at 11:40

## 2024-05-16 NOTE — DISCHARGE INSTRUCTIONS
Take prednisone orally as prescribed to help with inflammation/gout flare affecting your right knee, ankles and feet.    You may additionally take acetaminophen (Tylenol) as directed on over-the-counter packaging for discomfort.    Please schedule follow-up appointment with [facility]'s internal medicine office for reevaluation/ongoing care.

## 2024-05-17 NOTE — ED PROVIDER NOTES
History  Chief Complaint   Patient presents with    Foot Swelling     Bilat feet pain, swelling, and numbness for a month. Symptoms have been intermittent. Pt states R knee is swollen now as well. Hx of gout.     Patient is a 43-year-old male who comes to the emergency department explaining somewhere between 2-1/2 weeks and a month ago he began having numbness in his left ankle.  He describes the dorsum of the foot having swollen and then progression of swelling affecting the entirety of the left foot and ankle.  This led to his walking with a limp.  Approximately 1-1/2 weeks ago swelling began affecting the right foot and ankle similarly.  He also developed swelling of the right knee.  He does have history of gout affecting multiple joints but notes that he does not seem to have the same heat or pain intensity as with prior flares.  Despite the marked knee swelling he remains able to fully range this and weight-bear without knee pain.  As a result of the ankle and foot swelling he has been ambulating with a cane for steadiness.  No chest discomfort, dyspnea, fever, myalgias or discomfort elsewhere in the body.  No recalled injury, increased dependent positioning or kneeling on the right knee.  Past medical history significant for CKD stage II.  Upon review of prior notes it was unclear whether this was secondary to insufficiency disease versus a result of gout flares.  Last office notes, physician visits and blood work were from 2021.  Patient has not taken anything for discomfort.        Prior to Admission Medications   Prescriptions Last Dose Informant Patient Reported? Taking?   allopurinol (ZYLOPRIM) 100 mg tablet   No No   Sig: TAKE 2 TABLETS BY MOUTH EVERY DAY   methylPREDNISolone 4 MG tablet therapy pack   No No   Sig: Use as directed on package      Facility-Administered Medications: None       Past Medical History:   Diagnosis Date    Gout        Past Surgical History:   Procedure Laterality Date     APPENDECTOMY         Family History   Problem Relation Age of Onset    Diabetes Mother     No Known Problems Father      I have reviewed and agree with the history as documented.    E-Cigarette/Vaping     E-Cigarette/Vaping Substances     Social History     Tobacco Use    Smoking status: Never    Smokeless tobacco: Never   Substance Use Topics    Alcohol use: No    Drug use: No       Review of Systems   Genitourinary:  Negative for decreased urine volume, difficulty urinating and dysuria.   All other systems reviewed and are negative.      Physical Exam  Physical Exam  Vitals and nursing note reviewed.   HENT:      Head: Normocephalic.      Mouth/Throat:      Mouth: Mucous membranes are moist.   Cardiovascular:      Rate and Rhythm: Normal rate and regular rhythm.   Pulmonary:      Effort: Pulmonary effort is normal.      Breath sounds: Normal breath sounds.   Musculoskeletal:         General: Normal range of motion.      Comments: Discrete swelling of the right anterior knee bursal swelling with minimal increased warmth.  Site nontender.  Remainder of knee nontender and with full range of motion.    There is no popliteal fossa or proximal calf tenderness.    There is marked symmetric minimally pitting edema of the ankles and feet.  Minimally tender and without significant increased warmth.  No skin defects or discoloration.    Due to degree of swelling PT and DP pulses were not palpable.  These were dopplerable.    No upper extremity swelling.   Skin:     General: Skin is warm and dry.      Findings: No rash.   Neurological:      Mental Status: He is alert and oriented to person, place, and time.   Psychiatric:         Mood and Affect: Mood normal.         Behavior: Behavior normal.         Vital Signs  ED Triage Vitals   Temperature Pulse Respirations Blood Pressure SpO2   05/16/24 1110 05/16/24 1110 05/16/24 1110 05/16/24 1110 05/16/24 1110   97.8 °F (36.6 °C) 65 18 116/76 99 %      Temp Source Heart Rate Source  Patient Position - Orthostatic VS BP Location FiO2 (%)   05/16/24 1110 05/16/24 1110 -- 05/16/24 1110 --   Oral Monitor  Right arm       Pain Score       05/16/24 1140       2           Vitals:    05/16/24 1110   BP: 116/76   Pulse: 65         Visual Acuity      ED Medications  Medications   acetaminophen (TYLENOL) tablet 975 mg (975 mg Oral Given 5/16/24 1140)   predniSONE tablet 40 mg (40 mg Oral Given 5/16/24 1231)       Diagnostic Studies  Results Reviewed       Procedure Component Value Units Date/Time    Comprehensive metabolic panel [[id number]]  (Abnormal) Collected: 05/16/24 1143    Lab Status: Final result Specimen: Blood from Arm, Right Updated: 05/16/24 1210     Sodium 139 mmol/L      Potassium 3.9 mmol/L      Chloride 105 mmol/L      CO2 26 mmol/L      ANION GAP 8 mmol/L      BUN 15 mg/dL      Creatinine 1.39 mg/dL      Glucose 91 mg/dL      Calcium 9.0 mg/dL      AST 12 U/L      ALT 10 U/L      Alkaline Phosphatase 63 U/L      Total Protein 7.4 g/dL      Albumin 3.6 g/dL      Total Bilirubin 0.54 mg/dL      eGFR 60 ml/min/1.73sq m     Narrative:      National Kid[employer] Foundation guidelines for Chronic Kidney Disease (CKD):     Stage 1 with normal or high GFR (GFR > 90 mL/min/1.73 square meters)    Stage 2 Mild CKD (GFR = 60-89 mL/min/1.73 square meters)    Stage 3A Moderate CKD (GFR = 45-59 mL/min/1.73 square meters)    Stage 3B Moderate CKD (GFR = 30-44 mL/min/1.73 square meters)    Stage 4 Severe CKD (GFR = 15-29 mL/min/1.73 square meters)    Stage 5 End Stage CKD (GFR <15 mL/min/1.73 square meters)  Note: GFR calculation is accurate only with a steady state creatinine    Uric acid [[id number]]  (Abnormal) Collected: 05/16/24 1143    Lab Status: Final result Specimen: Blood from Arm, Right Updated: 05/16/24 1210     Uric Acid 8.9 mg/dL     Narrative:      N-acetyl-p-benzoquinone imine (metabolite of Acetaminophen) will generate erroneously low results in samples for patients that have taken an  overdose of Acetaminophen.    C-reactive protein [[id number]]  (Abnormal) Collected: 05/16/24 1143    Lab Status: Final result Specimen: Blood from Arm, Right Updated: 05/16/24 1208     CRP 36.9 mg/L     CBC and differential [[id number]]  (Abnormal) Collected: 05/16/24 1143    Lab Status: Final result Specimen: Blood from Arm, Right Updated: 05/16/24 1203     WBC 3.86 Thousand/uL      RBC 5.04 Million/uL      Hemoglobin 13.8 g/dL      Hematocrit 42.7 %      MCV 85 fL      MCH 27.4 pg      MCHC 32.3 g/dL      RDW 13.0 %      MPV 10.2 fL      Platelets 236 Thousands/uL      nRBC 0 /100 WBCs      Segmented % 64 %      Immature Grans % 1 %      Lymphocytes % 25 %      Monocytes % 7 %      Eosinophils Relative 2 %      Basophils Relative 1 %      Absolute Neutrophils 2.47 Thousands/µL      Absolute Immature Grans 0.03 Thousand/uL      Absolute Lymphocytes 0.95 Thousands/µL      Absolute Monocytes 0.28 Thousand/µL      Eosinophils Absolute 0.09 Thousand/µL      Basophils Absolute 0.04 Thousands/µL                    No orders to display              Procedures  Procedures         ED Course  Differential diagnosis includes but is not limited to acute gouty flare, volume overload, CHF exacerbation, nephropathy, lymphedema, DVT, venous insufficiency.    ED Course as of 05/17/24 0002   Thu May 16, 2024   1214 Renal function at patient's baseline.  White blood cell count is mildly reduced.  Patient's baseline seems to be upper threes to 5's.  No significant change today.  No left shift.  Uric acid and CRP levels both elevated.    Most suspicious for acute gouty flare.  Given duration of symptoms will place on longer steroid course.  Patient will be advised to follow-up with his primary care physician.    Right knee bursa could be drained although as this is not causing significant discomfort or limiting joint ranging in any way do not feel that this would be necessary or particularly beneficial.   1238 Care plan reviewed with  patient and mother.  He is comfortable with discharge and interested in resuming care with [facility] internal [facility] office.  Encouraged to make follow-up appointment.                                             Medical Decision Making  Amount and/or Complexity of Data Reviewed  Labs: ordered.    Risk  OTC drugs.  Prescription drug management.             Disposition  Final diagnoses:   Swelling of left lower extremity   Swelling of right lower extremity   Acute gout   Prepatellar bursitis of right knee     Time reflects when diagnosis was documented in both MDM as applicable and the Disposition within this note       Time User Action Codes Description Comment    5/16/2024 11:38 AM Kelsie Mirza Add [M79.89] Swelling of left lower extremity     5/16/2024 11:38 AM Kelsie Mirza Add [M79.89] Swelling of right lower extremity     5/16/2024 12:32 PM Kelsie Mirza Add [M10.9] Acute gout     5/16/2024 12:33 PM Kelsie Mirza Add [M70.41] Prepatellar bursitis of right knee           ED Disposition       ED Disposition   Discharge    Condition   Stable    Date/Time   Thu May 16, 2024 12:32 PM    Comment   Eran Live discharge to home/self care.                   Follow-up Information       Follow up With Specialties Details Why Contact Info Additional Information    [facility] Internal Medicine Schedule an appointment as soon as possible for a visit   [address] [facility] [address]  [phone] [facility], [address] [place], [address]   [phone]            Discharge Medication List as of 5/16/2024 12:34 PM        START taking these medications    Details   predniSONE 10 mg tablet Multiple Dosages:Starting Fri 5/17/2024, Until Mon 5/20/2024 at 2359, THEN Starting Tue 5/21/2024, Until Thu 5/23/2024 at 2359, THEN Starting Fri 5/24/2024, Until Sun 5/26/2024 at 2359Take 4  tablets (40 mg total) by mouth daily for 4 days, THEN 2 tab lets (20 mg total) daily for 3 days, THEN 1 tablet (10 mg total) daily for 3 days. Do not start before May 17, 2024., Normal           CONTINUE these medications which have NOT CHANGED    Details   allopurinol (ZYLOPRIM) 100 mg tablet TAKE 2 TABLETS BY MOUTH EVERY DAY, Normal      methylPREDNISolone 4 MG tablet therapy pack Use as directed on package, Normal             No discharge procedures on file.    PDMP Review       None            ED Provider  Electronically Signed by             Kelsie Mirza MD  05/17/24 0002

## 2024-05-29 ENCOUNTER — OFFICE VISIT (OUTPATIENT)
Dept: INTERNAL MEDICINE CLINIC | Facility: CLINIC | Age: 44
End: 2024-05-29

## 2024-05-29 VITALS
DIASTOLIC BLOOD PRESSURE: 78 MMHG | WEIGHT: 203 LBS | BODY MASS INDEX: 26.9 KG/M2 | HEIGHT: 73 IN | HEART RATE: 88 BPM | TEMPERATURE: 98.5 F | RESPIRATION RATE: 16 BRPM | SYSTOLIC BLOOD PRESSURE: 110 MMHG | OXYGEN SATURATION: 100 %

## 2024-05-29 DIAGNOSIS — N18.2 STAGE 2 CHRONIC KIDNEY DISEASE: Primary | ICD-10-CM

## 2024-05-29 DIAGNOSIS — M10.9 ACUTE GOUTY ARTHRITIS: ICD-10-CM

## 2024-05-29 DIAGNOSIS — M10.9 GOUT INVOLVING TOE, UNSPECIFIED CAUSE, UNSPECIFIED CHRONICITY, UNSPECIFIED LATERALITY: ICD-10-CM

## 2024-05-29 DIAGNOSIS — M1A.9XX0 CHRONIC GOUT WITHOUT TOPHUS, UNSPECIFIED CAUSE, UNSPECIFIED SITE: ICD-10-CM

## 2024-05-29 PROCEDURE — 99214 OFFICE O/P EST MOD 30 MIN: CPT

## 2024-05-29 RX ORDER — ALLOPURINOL 100 MG/1
300 TABLET ORAL DAILY
Qty: 270 TABLET | Refills: 0 | Status: SHIPPED | OUTPATIENT
Start: 2024-05-29 | End: 2024-08-27

## 2024-05-29 RX ORDER — PREDNISONE 10 MG/1
10 TABLET ORAL DAILY
Qty: 7 TABLET | Refills: 0 | Status: SHIPPED | OUTPATIENT
Start: 2024-05-29 | End: 2024-06-05

## 2024-05-29 NOTE — ASSESSMENT & PLAN NOTE
Dx around 2010  Pt had recent ED visit d/t pain and swelling.   Was given methylprednisolone as pt has CKD and needs to avoid NSAIDS  Pt started on allopurinol 200mg PO Daily in 2021 by  Cardio    PLAN:  Low purine diet  Continue allopurinol 200mg PO daily   Pt referred to rheumatology d/t atypical presentation of gout as well as nephrology in setting of CKD.

## 2024-05-29 NOTE — PROGRESS NOTES
Ambulatory Visit  Name: Eran Live      : 1980      MRN: [id number]  Encounter Provider: Rachid Martini MD  Encounter Date: 2024   Encounter department: [facility] [place]    Assessment & Plan   1. Stage 2 chronic kidney disease  Assessment & Plan:  Lab Results   Component Value Date    EGFR 60 2024    EGFR 67 10/05/2021    EGFR 69 2019    CREATININE 1.39 (H) 2024    CREATININE 1.48 (H) 10/05/2021    CREATININE 1.45 (H) 2019    CREATININE 1.40 (H) 2019   Baseline sCr. 1.4-1.5.    Previously following nephro but lost to follow up  Last seen in the office by Dr. Carrero in .  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   Pt asked to continue to avoid NSAIDs   Prednisone 10mg PO daily for 7 days to bridge to allopurinol.   Pt asked to restart allopurinol 300mg PO daily goal uric acid <6  Repeat uric acid in 1 months  Consider ECHO if symptoms do not resolve in 1 months     Orders:  -     Hemoglobin A1C; Future  -     Urinalysis with microscopic; Future  -     Ambulatory Referral to Nephrology; Future  2. Chronic gout without tophus, unspecified cause, unspecified site  Assessment & Plan:  Dx around   Pt had recent ED visit d/t pain and swelling.   Was given methylprednisolone as pt has CKD and needs to avoid NSAIDS  Pt started on allopurinol 200mg PO Daily in  by Dr. Carrero    PLAN:  Low purine diet  Continue allopurinol 200mg PO daily   Pt referred to rheumatology d/t atypical presentation of gout as well as nephrology in setting of CKD.      3. Gout involving toe, unspecified cause, unspecified chronicity, unspecified  "laterality  -     allopurinol (ZYLOPRIM) 100 mg tablet; Take 3 tablets (300 mg total) by mouth daily  -     Uric acid; Future; Expected date: 06/29/2024  -     Compression Stocking  -     predniSONE 10 mg tablet; Take 1 tablet (10 mg total) by mouth daily for 7 days  4. Acute gouty arthritis         History of Present Illness     42 yo male presented to the clinic after being seen in the ED [id number] d/t bilateral foot swelling    Pt was prescribed methylprednisolone pack as pt has CKD. Pt noted resolution of symptoms however on Evening of 28MAY left foot became swollen and morning of 29MAY right ankle is numb  Both are similar to presentation at the ED.     Patient endorses pain in left foot from dorsal aspect to plantar like \"being stabbed\" when walks rates as 7 or 8 out of 10. Pain is intermitent can go a few steps without pain then pain is present after a few steps. Does not hurt at rest.     Pt denies heat and constant pain like previous gout attacks.            Review of Systems   Constitutional:  Negative for fever and unexpected weight change.   HENT: Negative.     Eyes: Negative.    Respiratory: Negative.     Cardiovascular:  Positive for leg swelling. Negative for chest pain and palpitations.   Gastrointestinal: Negative.    Genitourinary: Negative.    Musculoskeletal:  Positive for gait problem and joint swelling. Negative for neck pain and neck stiffness.   Skin: Negative.    Neurological:  Negative for syncope, facial asymmetry and headaches.     Past Medical History:   Diagnosis Date    Gout      Past Surgical History:   Procedure Laterality Date    APPENDECTOMY       Family History   Problem Relation Age of Onset    Diabetes Mother     No Known Problems Father      Social History     Tobacco Use    Smoking status: Never    Smokeless tobacco: Never   Substance and Sexual Activity    Alcohol use: No    Drug use: No    Sexual activity: Not on file     Current Outpatient Medications on File Prior to Visit " "  Medication Sig    methylPREDNISolone 4 MG tablet therapy pack Use as directed on package (Patient not taking: Reported on 5/29/2024)    [DISCONTINUED] allopurinol (ZYLOPRIM) 100 mg tablet TAKE 2 TABLETS BY MOUTH EVERY DAY (Patient not taking: Reported on 5/29/2024)     No Known Allergies    There is no immunization history on file for this patient.  Objective     /78 (BP Location: Left arm, Patient Position: Sitting, Cuff Size: Adult)   Pulse 88   Temp 98.5 °F (36.9 °C) (Tympanic)   Resp 16   Ht 6' 1\" (1.854 m)   Wt 92.1 kg (203 lb)   SpO2 100%   BMI 26.78 kg/m²     Physical Exam  Constitutional:       General: He is not in acute distress.     Appearance: Normal appearance. He is normal weight. He is not ill-appearing, toxic-appearing or diaphoretic.   HENT:      Head: Normocephalic and atraumatic.      Nose: Nose normal.      Mouth/Throat:      Mouth: Mucous membranes are moist.   Eyes:      General: No scleral icterus.  Cardiovascular:      Rate and Rhythm: Normal rate.      Pulses: Normal pulses.   Pulmonary:      Effort: Pulmonary effort is normal.      Breath sounds: Normal breath sounds.   Musculoskeletal:      Cervical back: Normal range of motion and neck supple. No rigidity.   Feet:      Comments: Bilateral pitting edema L>R  Decreased ankle strength with both plantar and dorsiflect ion   Pain in the posterior right ankle with plantar flextion   Pain on left foot with both plantar and dorsi flextion    Decreased range of motion in all toes.    Toe cap refil normal   Lymphadenopathy:      Cervical: No cervical adenopathy.   Neurological:      Mental Status: He is alert and oriented to person, place, and time.       Administrative Statements     "

## 2024-05-29 NOTE — ASSESSMENT & PLAN NOTE
Lab Results   Component Value Date    EGFR 60 05/16/2024    EGFR 67 10/05/2021    EGFR 69 07/29/2019    CREATININE 1.39 (H) 05/16/2024    CREATININE 1.48 (H) 10/05/2021    CREATININE 1.45 (H) 07/29/2019    CREATININE 1.40 (H) 07/29/2019   Baseline sCr. 1.4-1.5.    Previously following nephro but lost to follow up  Last seen in the office by Dr. Carrero in 06OCT21.  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   Pt asked to continue to avoid NSAIDs   Prednisone 10mg PO daily for 7 days to bridge to allopurinol.   Pt asked to restart allopurinol 300mg PO daily goal uric acid <6  Repeat uric acid in 1 months  Consider ECHO if symptoms do not resolve in 1 months

## 2024-05-30 ENCOUNTER — APPOINTMENT (OUTPATIENT)
Dept: LAB | Facility: CLINIC | Age: 44
End: 2024-05-30
Payer: COMMERCIAL

## 2024-05-30 ENCOUNTER — TELEPHONE (OUTPATIENT)
Dept: NEPHROLOGY | Facility: CLINIC | Age: 44
End: 2024-05-30

## 2024-05-30 DIAGNOSIS — N18.2 STAGE 2 CHRONIC KIDNEY DISEASE: ICD-10-CM

## 2024-05-30 LAB
BACTERIA UR QL AUTO: ABNORMAL /HPF
BILIRUB UR QL STRIP: NEGATIVE
CLARITY UR: CLEAR
COLOR UR: ABNORMAL
EST. AVERAGE GLUCOSE BLD GHB EST-MCNC: 131 MG/DL
GLUCOSE UR STRIP-MCNC: NEGATIVE MG/DL
HBA1C MFR BLD: 6.2 %
HGB UR QL STRIP.AUTO: NEGATIVE
KETONES UR STRIP-MCNC: NEGATIVE MG/DL
LEUKOCYTE ESTERASE UR QL STRIP: NEGATIVE
MUCOUS THREADS UR QL AUTO: ABNORMAL
NITRITE UR QL STRIP: NEGATIVE
NON-SQ EPI CELLS URNS QL MICRO: ABNORMAL /HPF
PH UR STRIP.AUTO: 5.5 [PH]
PROT UR STRIP-MCNC: NEGATIVE MG/DL
RBC #/AREA URNS AUTO: ABNORMAL /HPF
SP GR UR STRIP.AUTO: 1.01 (ref 1–1.03)
UROBILINOGEN UR STRIP-ACNC: <2 MG/DL
WBC #/AREA URNS AUTO: ABNORMAL /HPF

## 2024-05-30 PROCEDURE — 36415 COLL VENOUS BLD VENIPUNCTURE: CPT

## 2024-05-30 PROCEDURE — 81001 URINALYSIS AUTO W/SCOPE: CPT

## 2024-05-30 PROCEDURE — 83036 HEMOGLOBIN GLYCOSYLATED A1C: CPT

## 2024-06-26 ENCOUNTER — CONSULT (OUTPATIENT)
Dept: NEPHROLOGY | Facility: CLINIC | Age: 44
End: 2024-06-26

## 2024-06-26 VITALS
DIASTOLIC BLOOD PRESSURE: 76 MMHG | BODY MASS INDEX: 27.83 KG/M2 | WEIGHT: 210 LBS | HEIGHT: 73 IN | SYSTOLIC BLOOD PRESSURE: 118 MMHG

## 2024-06-26 DIAGNOSIS — M25.472 BILATERAL SWELLING OF FEET AND ANKLES: ICD-10-CM

## 2024-06-26 DIAGNOSIS — N18.2 STAGE 2 CHRONIC KIDNEY DISEASE: ICD-10-CM

## 2024-06-26 DIAGNOSIS — M10.9 GOUT INVOLVING TOE, UNSPECIFIED CAUSE, UNSPECIFIED CHRONICITY, UNSPECIFIED LATERALITY: Primary | ICD-10-CM

## 2024-06-26 DIAGNOSIS — M25.475 BILATERAL SWELLING OF FEET AND ANKLES: ICD-10-CM

## 2024-06-26 DIAGNOSIS — M25.471 BILATERAL SWELLING OF FEET AND ANKLES: ICD-10-CM

## 2024-06-26 DIAGNOSIS — M25.474 BILATERAL SWELLING OF FEET AND ANKLES: ICD-10-CM

## 2024-06-26 PROCEDURE — 99204 OFFICE O/P NEW MOD 45 MIN: CPT | Performed by: INTERNAL MEDICINE

## 2024-06-26 NOTE — PATIENT INSTRUCTIONS
Your kidney function has been stable since 2018.   Continue with Allopurinol 300 mg daily.   Check blood and urine tests this Friday.   Minimize your salt intake and keep your feet elevated.   Follow up in 6 months.

## 2024-06-26 NOTE — PROGRESS NOTES
NEPHROLOGY OUTPATIENT CONSULTATION   Eran Live 43 y.o. male MRN: [id number]  Date: 06/26/24  Reason for consultation: Initial evaluation of CKD.     ASSESSMENT and PLAN:  Chronic kidney disease, stage II:  Baseline creatinine is around 1.4 to 1.6.   Cystatin C in July 2019 was 1.26.  Urinalysis is bland.  Renal ultrasound is unremarkable.  Etiology for CKD is unclear - possibly arterio and arteriolosclerosis vs uric acid nephropathy.   It is unclear if the hyperuricemia led to CKD or if the CKD is causing hyperuricemia. Nevertheless, the management is the same.   Treatment/Management consists of controlling modifiable risk factors (good blood pressure, glycemic and lipid control) and avoidance of nephrotoxic medications in order to slow down progression of renal disease.  Check CMP since it has been a month since restarting Allopurinol.   Check urine ACR to evaluate urinary protein excretion.     Gout, hyperuricemia.   Continue allopurinol 300 mg daily.  He will be rechecking a uric acid level later this week.    Ankle swelling  Possibly related to gout.    No evidence of systemic fluid overload.  Advised low-sodium diet and elevation of legs.    Patient Instructions   Your kidney function has been stable since 2018.   Continue with Allopurinol 300 mg daily.   Check blood and urine tests this Friday.   Minimize your salt intake and keep your feet elevated.   Follow up in 6 months.     HISTORY OF PRESENT ILLNESS:  Requesting Physician: Ever Carrero MD    Eran Live is a 43 y.o. male who was referred for evaluation of CKD.     Eran has a history of gout, migraine headaches, and chronic kidney disease.    For his CKD, he was seen by my colleague, Dr. Bland, in July 2019.  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  Based on my personal review of the records, I note that his creatinine has ranged between 1.39 and 1.60 since November 2018.  His first available  lab work in the system is from November 19, 2018 when his creatinine was 1.51.  His most recent set of labs are from May 16, 2024 which revealed a creatinine of 1.39.  A renal ultrasound done in 2019 was unremarkable except for a mildly thin right renal cortex.  His urinalyses from July 2019 to May 2024 did not indicate the presence of proteinuria.    He denies a history of hypertension, diabetes mellitus, cardiac, or pulmonary disease.  He has known gout and recently restarted taking allopurinol 300 mg daily.    About 10-15 years ago, he used to take Aleve 2-4 tablets daily for a year for headaches.   No history of gross hematuria.   No history of moonshine use.   No history of frequent UTIs.     PAST MEDICAL HISTORY:  Gout: Diagnosed about 12 years ago.   Migraine headaches  Asthma: as a child.     No known history of HTN, DM, HLP, CAD, CHF, CVA, PAD, COPD, ANDRE, asthma, thromboembolism, liver disease, GERD, nephrolithiasis, BPH, malignancy, degenerative joint or disc disease, psychiatric disease.    PAST SURGICAL HISTORY:  Appendectomy    ALLERGIES:  No Known Allergies    SOCIAL HISTORY:  Non smoker  No alcohol abuse.   No IVDA.     FAMILY HISTORY:  Negative for ESRD.   Mother had CKD and was diabetic. She also had kidney stones.     MEDICATIONS:    Current Outpatient Medications:     allopurinol (ZYLOPRIM) 100 mg tablet, Take 3 tablets (300 mg total) by mouth daily, Disp: 270 tablet, Rfl: 0    methylPREDNISolone 4 MG tablet therapy pack, Use as directed on package (Patient not taking: Reported on 5/29/2024), Disp: 21 tablet, Rfl: 0    REVIEW OF SYSTEMS:  Review of Systems   Constitutional:  Negative for appetite change, fatigue and fever.   HENT:  Negative for postnasal drip, rhinorrhea, sinus pressure and sinus pain.    Eyes:  Positive for photophobia.   Respiratory:  Positive for shortness of breath (on exertion). Negative for cough.    Cardiovascular:  Positive for leg swelling. Negative for chest pain.  "  Gastrointestinal:  Negative for abdominal pain, diarrhea, nausea and vomiting.   Genitourinary:  Negative for difficulty urinating, dysuria and hematuria.   Musculoskeletal:  Positive for arthralgias. Negative for back pain.   Skin:  Negative for rash.   Allergic/Immunologic: Negative for immunocompromised state.   Neurological:  Negative for dizziness and light-headedness.   Hematological:  Does not bruise/bleed easily.   Psychiatric/Behavioral:  Negative for dysphoric mood. The patient is not nervous/anxious.    All the systems were reviewed and were negative except as documented on the HPI.    PHYSICAL EXAMINATION:  /76   Ht 6' 1\" (1.854 m)   Wt 95.3 kg (210 lb)   BMI 27.71 kg/m²   Current Weight: Weight - Scale: 95.3 kg (210 lb) Body mass index is 27.71 kg/m².  General: conscious, coherent, cooperative, not in distress.   Skin: warm, dry, good turgor.   Eyes: pink conjunctivae, no scleral icterus.   ENT: moist lips and mucous membranes.   Respiratory: equal chest expansion, clear breath sounds.   Cardiovascular: distinct heart sounds, normal rate, regular rhythm, no JVD.   Abdomen: soft, non-tender, non-distended, normoactive bowel sounds  Extremities: (+) mild bilateral ankle edema.  Genitourinary: no mendez catheter.   Neuro: awake, alert, oriented to time, place and person.   Psych: appropriate affect.     LABORATORY RESULTS:  Lab Results   Component Value Date    WBC 3.86 (L) 05/16/2024    HGB 13.8 05/16/2024    HCT 42.7 05/16/2024    MCV 85 05/16/2024     05/16/2024     Lab Results   Component Value Date    SODIUM 139 05/16/2024    K 3.9 05/16/2024     05/16/2024    CO2 26 05/16/2024    AGAP 8 05/16/2024    BUN 15 05/16/2024    CREATININE 1.39 (H) 05/16/2024    GLUC 91 05/16/2024    GLUF 87 11/19/2018    CALCIUM 9.0 05/16/2024    AST 12 (L) 05/16/2024    ALT 10 05/16/2024    ALKPHOS 63 05/16/2024    TP 7.4 05/16/2024    TBILI 0.54 05/16/2024    EGFR 60 05/16/2024     IMAGING: "   7/16/19 Renal US: R 10.7 cm, L 10.2 cm, no hydronephrosis, mildly thin right sided renal cortex.

## 2024-06-28 ENCOUNTER — TELEPHONE (OUTPATIENT)
Dept: NEPHROLOGY | Facility: CLINIC | Age: 44
End: 2024-06-28

## 2024-06-28 ENCOUNTER — APPOINTMENT (OUTPATIENT)
Dept: LAB | Facility: CLINIC | Age: 44
End: 2024-06-28

## 2024-06-28 DIAGNOSIS — N18.2 STAGE 2 CHRONIC KIDNEY DISEASE: ICD-10-CM

## 2024-06-28 DIAGNOSIS — M10.9 GOUT INVOLVING TOE, UNSPECIFIED CAUSE, UNSPECIFIED CHRONICITY, UNSPECIFIED LATERALITY: ICD-10-CM

## 2024-06-28 LAB
ALBUMIN SERPL BCG-MCNC: 3.9 G/DL (ref 3.5–5)
ALP SERPL-CCNC: 66 U/L (ref 34–104)
ALT SERPL W P-5'-P-CCNC: 18 U/L (ref 7–52)
ANION GAP SERPL CALCULATED.3IONS-SCNC: 8 MMOL/L (ref 4–13)
AST SERPL W P-5'-P-CCNC: 20 U/L (ref 13–39)
BILIRUB SERPL-MCNC: 0.9 MG/DL (ref 0.2–1)
BUN SERPL-MCNC: 22 MG/DL (ref 5–25)
CALCIUM SERPL-MCNC: 9 MG/DL (ref 8.4–10.2)
CHLORIDE SERPL-SCNC: 102 MMOL/L (ref 96–108)
CO2 SERPL-SCNC: 25 MMOL/L (ref 21–32)
CREAT SERPL-MCNC: 1.48 MG/DL (ref 0.6–1.3)
CREAT UR-MCNC: 103 MG/DL
GFR SERPL CREATININE-BSD FRML MDRD: 56 ML/MIN/1.73SQ M
GLUCOSE P FAST SERPL-MCNC: 87 MG/DL (ref 65–99)
MICROALBUMIN UR-MCNC: <7 MG/L
POTASSIUM SERPL-SCNC: 4 MMOL/L (ref 3.5–5.3)
PROT SERPL-MCNC: 7 G/DL (ref 6.4–8.4)
SODIUM SERPL-SCNC: 135 MMOL/L (ref 135–147)
URATE SERPL-MCNC: 6.2 MG/DL (ref 3.5–8.5)

## 2024-06-28 PROCEDURE — 36415 COLL VENOUS BLD VENIPUNCTURE: CPT

## 2024-06-28 PROCEDURE — 82570 ASSAY OF URINE CREATININE: CPT

## 2024-06-28 PROCEDURE — 80053 COMPREHEN METABOLIC PANEL: CPT

## 2024-06-28 PROCEDURE — 82043 UR ALBUMIN QUANTITATIVE: CPT

## 2024-06-28 PROCEDURE — 84550 ASSAY OF BLOOD/URIC ACID: CPT

## 2024-06-28 NOTE — TELEPHONE ENCOUNTER
----- Message from Marcos Jo MD sent at 6/28/2024 10:08 AM EDT -----  Please inform patient that most recent labs (6/28/24) show that kidney function is stable and uric acid is in the normal range.   Urinary Tract Infection in Women: Care Instructions  Your Care Instructions    A urinary tract infection, or UTI, is a general term for an infection anywhere between the kidneys and the urethra (where urine comes out). Most UTIs are bladder infections. They often cause pain or burning when you urinate. UTIs are caused by bacteria and can be cured with antibiotics. Be sure to complete your treatment so that the infection goes away. Follow-up care is a key part of your treatment and safety. Be sure to make and go to all appointments, and call your doctor if you are having problems. It's also a good idea to know your test results and keep a list of the medicines you take. How can you care for yourself at home? · Take your antibiotics as directed. Do not stop taking them just because you feel better. You need to take the full course of antibiotics. · Drink extra water and other fluids for the next day or two. This may help wash out the bacteria that are causing the infection. (If you have kidney, heart, or liver disease and have to limit fluids, talk with your doctor before you increase your fluid intake.)  · Avoid drinks that are carbonated or have caffeine. They can irritate the bladder. · Urinate often. Try to empty your bladder each time. · To relieve pain, take a hot bath or lay a heating pad set on low over your lower belly or genital area. Never go to sleep with a heating pad in place. To prevent UTIs  · Drink plenty of water each day. This helps you urinate often, which clears bacteria from your system. (If you have kidney, heart, or liver disease and have to limit fluids, talk with your doctor before you increase your fluid intake.)  · Urinate when you need to. · Urinate right after you have sex. · Change sanitary pads often. · Avoid douches, bubble baths, feminine hygiene sprays, and other feminine hygiene products that have deodorants.   · After going to the bathroom, wipe from front to back.  When should you call for help? Call your doctor now or seek immediate medical care if:  · Symptoms such as fever, chills, nausea, or vomiting get worse or appear for the first time. · You have new pain in your back just below your rib cage. This is called flank pain. · There is new blood or pus in your urine. · You have any problems with your antibiotic medicine. Watch closely for changes in your health, and be sure to contact your doctor if:  · You are not getting better after taking an antibiotic for 2 days. · Your symptoms go away but then come back. Where can you learn more? Go to http://trinity-naomie.info/. Enter J988 in the search box to learn more about \"Urinary Tract Infection in Women: Care Instructions. \"  Current as of: November 28, 2016  Content Version: 11.2  © 1402-6858 [employer], [employer]. Care instructions adapted under license by [employer] (which disclaims liability or warranty for this information). If you have questions about a medical condition or this instruction, always ask your healthcare professional. [address] any warranty or liability for your use of this information.

## 2024-07-02 ENCOUNTER — OFFICE VISIT (OUTPATIENT)
Dept: INTERNAL MEDICINE CLINIC | Facility: CLINIC | Age: 44
End: 2024-07-02

## 2024-07-02 VITALS
RESPIRATION RATE: 16 BRPM | TEMPERATURE: 97.6 F | DIASTOLIC BLOOD PRESSURE: 68 MMHG | OXYGEN SATURATION: 99 % | BODY MASS INDEX: 27.96 KG/M2 | HEART RATE: 83 BPM | WEIGHT: 211 LBS | SYSTOLIC BLOOD PRESSURE: 100 MMHG | HEIGHT: 73 IN

## 2024-07-02 DIAGNOSIS — M25.472 BILATERAL SWELLING OF FEET AND ANKLES: ICD-10-CM

## 2024-07-02 DIAGNOSIS — Z11.4 SCREENING FOR HIV (HUMAN IMMUNODEFICIENCY VIRUS): ICD-10-CM

## 2024-07-02 DIAGNOSIS — M25.474 BILATERAL SWELLING OF FEET AND ANKLES: ICD-10-CM

## 2024-07-02 DIAGNOSIS — M25.475 BILATERAL SWELLING OF FEET AND ANKLES: ICD-10-CM

## 2024-07-02 DIAGNOSIS — Z00.00 ANNUAL PHYSICAL EXAM: ICD-10-CM

## 2024-07-02 DIAGNOSIS — Z11.59 NEED FOR HEPATITIS C SCREENING TEST: ICD-10-CM

## 2024-07-02 DIAGNOSIS — N18.2 STAGE 2 CHRONIC KIDNEY DISEASE: Primary | ICD-10-CM

## 2024-07-02 DIAGNOSIS — M10.9 GOUT INVOLVING TOE, UNSPECIFIED CAUSE, UNSPECIFIED CHRONICITY, UNSPECIFIED LATERALITY: ICD-10-CM

## 2024-07-02 DIAGNOSIS — M25.471 BILATERAL SWELLING OF FEET AND ANKLES: ICD-10-CM

## 2024-07-02 DIAGNOSIS — Z12.11 SCREENING FOR COLON CANCER: ICD-10-CM

## 2024-07-02 RX ORDER — ACETAMINOPHEN 325 MG/1
325 TABLET ORAL EVERY 6 HOURS PRN
COMMUNITY

## 2024-07-02 NOTE — ASSESSMENT & PLAN NOTE
Possibly related to gout.    No evidence of systemic fluid overload.  Advised low-sodium diet and elevation of legs.  Continue compression stalking   Continue to follow nephrology  Consider ECHO if symptoms dont resolve  PLAN:  Will get baseline BNP to r/o cardiac etiology

## 2024-07-02 NOTE — PROGRESS NOTES
Ambulatory Visit  Name: Eran Live      : 1980      MRN: [id number]  Encounter Provider: Rachid Martini MD  Encounter Date: 2024   Encounter department: [facility] INTERNAL MEDICINE [place]    Assessment & Plan   1. Stage 2 chronic kidney disease  Assessment & Plan:  Lab Results   Component Value Date    EGFR 56 2024    EGFR 60 2024    EGFR 67 10/05/2021    CREATININE 1.48 (H) 2024    CREATININE 1.39 (H) 2024    CREATININE 1.48 (H) 10/05/2021         Lab Results   Component Value Date    HGBA1C 6.2 (H) 2024      [id number]: Albumin Cr Ratio WNL  10FMT80: Uric Acid 6.2  04SWG25: UA Normal  US Kidney unremarkable according to nephrology note however could not find recent report   Baseline sCr. 1.4-1.5.    Previously following nephro but lost to follow up  Now following [facility] Nephro w/ last visit [id number]    Symptoms of lower extremity swelling    Uric acid nephropathy vs atherosclerotic nephropathy vs other vs Nephrotic nephritic syndrome  Nephrotic nephritic less likely since urine protein not elevated.     Plan:  Avoid hypoperfusion of the kidneys, minimize nephrotoxins  Control modifiable risk factors, BP Glucose  Avoid nephrotoxic agents like NSAIDS RAAS Blockers  Continue allopurinol 100mg PO daily w/ goal uric acid <6  Consider ECHO if symptoms do not resolve for potential cardiac evolvement   Strict lifestyle management  Life style modifications  Eat real food, not too much, mostly plants  Avoid processed foods  DASH Diet  Limit salt intake: education given on how to read nutriton labels to find salt (ie listed as Salt, NaCl, Sodium). Pt was educated on types of foods and drinks with high salt content.   Do not drink your calories  Eat a piece of fruit or vegetable 30 mins prior to meals  Avoid sugar and sugar substitutes  Limit meat products  Per AHA guidelines, recommend moderate-vigorous intensity exercise for 30 minutes a day for 5 days a week or a  total of 150 min/week  Keep exercise journal  Small changes in activity makes a big difference ie take stairs instead of elevators or park farther away from you destination  Try to weigh yourself daily at same time of day and keep journal  Keep food journal   Sleep hygine  Mindful meditation for 15mins a day 5 days a week. Insight timer a good free walt.     2. Gout involving toe, unspecified cause, unspecified chronicity, unspecified laterality  Assessment & Plan:  Dx around 2010  Pt had recent ED visit d/t pain and swelling.   Was given methylprednisolone as pt has CKD and needs to avoid NSAIDS  Pt started on allopurinol 200mg PO Daily in 2021 by Dr. Carrero  [facility] [place] [facility] MAY[facility]  S/p Methylprednisolone terapy pack ordered [id number]  93BEG62: Uric Acid 6.2  62DXP02: UA Normal  2018 RF and CCP Neg  FLARES: First metatarsal right foot w/ pain swelling of the legs   PLAN:  Low purine diet  Continue allopurinol 300mg PO daily   Continue follow up visit w/ nephrology   Compression stockings  Pt given handout about low purine diet    NSAIDS contraindicated in setting of CKD  Would avoid diuretics as pt has low BP and CKD    3. Bilateral swelling of feet and ankles  Assessment & Plan:  Possibly related to gout.    No evidence of systemic fluid overload.  Advised low-sodium diet and elevation of legs.  Continue compression stalking   Continue to follow nephrology  Consider ECHO if symptoms dont resolve  PLAN:  Will get baseline BNP to r/o cardiac etiology   Orders:  -     B-Type Natriuretic Peptide(BNP); Future  4. Annual physical exam  Assessment & Plan:  Outstanding needs to schedule ASAP  Orders:  -     Lipid panel; Future  -     TSH + Free T4; Future  -     Hemoglobin A1C; Future  5. Screening for colon cancer  Assessment & Plan:  Due now that pt is 43    PLAN:  Ambulatory referral to GI  Orders:  -     Ambulatory Referral to Gastroenterology; Future  6. Need for hepatitis C  "screening test  Assessment & Plan:  Over due    PLAN  Lab ordered   Orders:  -     Viral Hepatitis Screening and Diagnosis (HBV, HCV); Future  7. Screening for HIV (human immunodeficiency virus)  Assessment & Plan:  Over duet  PLAN  Screening lab ordered   Orders:  -     HIV 1/2 AG/AB w Reflex [facility] for 2 yr old and above; Future       History of Present Illness     44 yo male presented to the clinic for 5 week follow up  Orignally presented in MAY24 after being seen in the ED [id number] d/t bilateral foot swelling  After ED Pt was prescribed methylprednisolone pack as pt has CKD. Pt noted resolution of symptoms however on Evening of 28MAY left foot became swollen and morning of 29MAY right ankle is numb  Both are similar to presentation at the ED. Pt then endorsed pain in left foot from dorsal aspect to plantar like \"being stabbed\" when walks rates as 7 or 8 out of 10. Pain is intermitent can go a few steps without pain then pain is present after a few steps. Does not hurt at rest.   At the PCP visit in MAY24 pt asked to restart allopurinol was given a methylprednisolone therapy pack and asked to see nephrology and rheum. Pt was stressed importance of staying on allopurinol and dose increased to 300mg PO daily. UA was ordered to screen for nephrotic/nephritic syndrome syndrome which was unremarkable.   Since then he has followed with nephrology.   Pt presents today with needle sensation in right toe on wed [id number] making him call out from work and self resolved.   Pt notes left leg still swollen. Patient still has same amount of pain in left foot and swelling comes and goes. Pain and swelling has no correlation with time of day.  Pt notes good diet.   Lunch is a muffin, dinner is baked chiken, no fast food.    Pt notes compliance with allopurinol,   Pt noted steroids helped. And has not gotten back to original swelling.         Review of Systems   Constitutional:  Negative for fever.   HENT: Negative.     Eyes: " "Negative.    Respiratory: Negative.     Cardiovascular:  Positive for leg swelling. Negative for chest pain and palpitations.   Gastrointestinal: Negative.    Genitourinary: Negative.    Neurological: Negative.        Objective     /68 (BP Location: Left arm, Patient Position: Sitting, Cuff Size: Adult)   Pulse 83   Temp 97.6 °F (36.4 °C) (Tympanic)   Resp 16   Ht 6' 1\" (1.854 m)   Wt 95.7 kg (211 lb)   SpO2 99%   BMI 27.84 kg/m²     Physical Exam  Vitals and nursing note reviewed.   Constitutional:       General: He is not in acute distress.     Appearance: Normal appearance. He is normal weight. He is not ill-appearing, toxic-appearing or diaphoretic.   HENT:      Nose: Nose normal.      Mouth/Throat:      Mouth: Mucous membranes are moist.      Pharynx: Oropharynx is clear.   Eyes:      General: No scleral icterus.     Conjunctiva/sclera: Conjunctivae normal.   Cardiovascular:      Rate and Rhythm: Normal rate and regular rhythm.      Pulses: Normal pulses.   Pulmonary:      Effort: Pulmonary effort is normal. No respiratory distress.      Breath sounds: Normal breath sounds. No stridor. No wheezing, rhonchi or rales.   Abdominal:      General: Abdomen is flat. There is no distension.      Palpations: Abdomen is soft.      Tenderness: There is no abdominal tenderness. There is no guarding or rebound.   Musculoskeletal:         General: Swelling present.      Cervical back: Neck supple. No rigidity or tenderness.      Right lower leg: Edema present.      Left lower leg: Edema present.      Comments: Right 1+ pitting edema  Left 2+ pitting edema    Right MCP swelling   Lymphadenopathy:      Cervical: No cervical adenopathy.   Skin:     General: Skin is warm and dry.      Capillary Refill: Capillary refill takes less than 2 seconds.   Neurological:      Mental Status: He is alert and oriented to person, place, and time.   Psychiatric:         Behavior: Behavior normal.       Administrative Statements "     Nutrition Assessment and Intervention:     New Nutrition Prescription completed with patient      Other interventions: Pt asked to drink only water.     Tobacco and Toxic Substance Assessment and Intervention:     Tobacco use screening performed    Alcohol and drug use screening performed

## 2024-07-02 NOTE — ASSESSMENT & PLAN NOTE
Dx around 2010  Pt had recent ED visit d/t pain and swelling.   Was given methylprednisolone as pt has CKD and needs to avoid NSAIDS  Pt started on allopurinol 200mg PO Daily in 2021 by Dr. Carrero  [facility] [place] [facility]  S/p Methylprednisolone terapy pack ordered [id number]  84CAE25: Uric Acid 6.2  31XGC44: UA Normal  2018 RF and CCP Neg  FLARES: First metatarsal right foot w/ pain swelling of the legs   PLAN:  Low purine diet  Continue allopurinol 300mg PO daily   Continue follow up visit w/ nephrology   Compression stockings  Pt given handout about low purine diet    NSAIDS contraindicated in setting of CKD  Would avoid diuretics as pt has low BP and CKD

## 2024-07-19 ENCOUNTER — APPOINTMENT (OUTPATIENT)
Dept: LAB | Facility: CLINIC | Age: 44
End: 2024-07-19

## 2024-07-19 DIAGNOSIS — M25.472 BILATERAL SWELLING OF FEET AND ANKLES: ICD-10-CM

## 2024-07-19 DIAGNOSIS — Z11.59 NEED FOR HEPATITIS C SCREENING TEST: ICD-10-CM

## 2024-07-19 DIAGNOSIS — M25.475 BILATERAL SWELLING OF FEET AND ANKLES: ICD-10-CM

## 2024-07-19 DIAGNOSIS — M25.471 BILATERAL SWELLING OF FEET AND ANKLES: ICD-10-CM

## 2024-07-19 DIAGNOSIS — M25.474 BILATERAL SWELLING OF FEET AND ANKLES: ICD-10-CM

## 2024-07-19 DIAGNOSIS — Z11.4 SCREENING FOR HIV (HUMAN IMMUNODEFICIENCY VIRUS): ICD-10-CM

## 2024-07-19 DIAGNOSIS — Z00.00 ANNUAL PHYSICAL EXAM: ICD-10-CM

## 2024-07-19 LAB
BNP SERPL-MCNC: 4 PG/ML (ref 0–100)
CHOLEST SERPL-MCNC: 189 MG/DL
EST. AVERAGE GLUCOSE BLD GHB EST-MCNC: 111 MG/DL
HBA1C MFR BLD: 5.5 %
HDLC SERPL-MCNC: 41 MG/DL
HIV 1+2 AB+HIV1 P24 AG SERPL QL IA: NORMAL
HIV 2 AB SERPL QL IA: NORMAL
HIV1 AB SERPL QL IA: NORMAL
HIV1 P24 AG SERPL QL IA: NORMAL
LDLC SERPL CALC-MCNC: 135 MG/DL (ref 0–100)
NONHDLC SERPL-MCNC: 148 MG/DL
T4 FREE SERPL-MCNC: 0.93 NG/DL (ref 0.61–1.12)
TRIGL SERPL-MCNC: 65 MG/DL
TSH SERPL DL<=0.05 MIU/L-ACNC: 0.95 UIU/ML (ref 0.45–4.5)

## 2024-07-19 PROCEDURE — 83880 ASSAY OF NATRIURETIC PEPTIDE: CPT

## 2024-07-19 PROCEDURE — 36415 COLL VENOUS BLD VENIPUNCTURE: CPT

## 2024-07-19 PROCEDURE — 87389 HIV-1 AG W/HIV-1&-2 AB AG IA: CPT

## 2024-07-19 PROCEDURE — 80061 LIPID PANEL: CPT

## 2024-07-19 PROCEDURE — 84443 ASSAY THYROID STIM HORMONE: CPT

## 2024-07-19 PROCEDURE — 80074 ACUTE HEPATITIS PANEL: CPT

## 2024-07-19 PROCEDURE — 84439 ASSAY OF FREE THYROXINE: CPT

## 2024-07-19 PROCEDURE — 83036 HEMOGLOBIN GLYCOSYLATED A1C: CPT

## 2024-07-22 LAB — MISCELLANEOUS LAB TEST RESULT: NORMAL

## 2024-08-01 PROBLEM — Z11.59 NEED FOR HEPATITIS C SCREENING TEST: Status: RESOLVED | Noted: 2024-07-02 | Resolved: 2024-08-01

## 2024-08-01 PROBLEM — Z12.11 SCREENING FOR COLON CANCER: Status: RESOLVED | Noted: 2024-07-02 | Resolved: 2024-08-01

## 2024-08-01 PROBLEM — Z11.4 SCREENING FOR HIV (HUMAN IMMUNODEFICIENCY VIRUS): Status: RESOLVED | Noted: 2024-07-02 | Resolved: 2024-08-01

## 2024-09-02 DIAGNOSIS — M10.9 GOUT INVOLVING TOE, UNSPECIFIED CAUSE, UNSPECIFIED CHRONICITY, UNSPECIFIED LATERALITY: ICD-10-CM

## 2024-09-03 RX ORDER — ALLOPURINOL 100 MG/1
300 TABLET ORAL DAILY
Qty: 270 TABLET | Refills: 1 | Status: SHIPPED | OUTPATIENT
Start: 2024-09-03

## 2024-10-14 NOTE — ASSESSMENT & PLAN NOTE
Bilateral swelling of feet and ankles at previous visit  Lab Results   Component Value Date    BNP 4 07/19/2024     Currently no swelling some stiffness     PLAN:    Possibly related to gout.    No evidence of systemic fluid overload.  Advised low-sodium diet and elevation of legs.  Continue compression stalking   Continue to follow nephrology  Consider ECHO if symptoms dont resolve

## 2024-10-14 NOTE — ASSESSMENT & PLAN NOTE
Dx around 2010  Pt had recent ED visit d/t pain and swelling.   Was given methylprednisolone as pt has CKD and needs to avoid NSAIDS  Pt started on allopurinol 200mg PO Daily in 2021 by Dr. Carrero  [facility]  S/p Methylprednisolone terapy pack ordered 28MAY24  12FCO71: Uric Acid 6.2  17AAV34: UA Normal  2018 RF and CCP Neg  FLARES: First metatarsal right foot w/ pain swelling of the legs   PLAN:  Low purine diet  Continue allopurinol 300mg PO daily   Continue follow up visit w/ nephrology   Compression stockings  Pt given handout about low purine diet at previous visit.   NSAIDS contraindicated in setting of CKD  Would avoid diuretics as pt has low BP and CKD

## 2024-10-14 NOTE — ASSESSMENT & PLAN NOTE
Lab Results   Component Value Date    EGFR 56 06/28/2024    EGFR 60 05/16/2024    EGFR 67 10/05/2021    CREATININE 1.48 (H) 06/28/2024    CREATININE 1.39 (H) 05/16/2024    CREATININE 1.48 (H) 10/05/2021     Lab Results   Component Value Date    HGBA1C 5.5 07/19/2024     Lab Results   Component Value Date    BNP 4 07/19/2024     85SAQ22: Albumin Cr Ratio WNL  53WHF73: Uric Acid 6.2  27WWM28: UA Normal  US Kidney unremarkable according to nephrology note however could not find recent report   Baseline sCr. 1.4-1.5.    Now following [facility] Nephro w/ last visit [id number]     SYMPTOMS: Currently no swelling     Uric acid nephropathy vs atherosclerotic nephropathy vs other vs Nephrotic nephritic syndrome  Nephrotic nephritic less likely since urine protein not elevated.      Plan:  Avoid hypoperfusion of the kidneys, minimize nephrotoxins  Control modifiable risk factors, BP Glucose  Avoid nephrotoxic agents like NSAIDS RAAS Blockers  Continue allopurinol 300mg PO daily w/ goal uric acid <6  Consider ECHO if symptoms do not resolve for potential cardiac evolvement   Strict lifestyle management  Life style modifications  Eat real food, not too much, mostly plants  Avoid processed foods  DASH Diet  Limit salt intake: education given on how to read nutriton labels to find salt (ie listed as Salt, NaCl, Sodium). Pt was educated on types of foods and drinks with high salt content.   Do not drink your calories  Eat a piece of fruit or vegetable 30 mins prior to meals  Avoid sugar and sugar substitutes  Limit meat products  Per AHA guidelines, recommend moderate-vigorous intensity exercise for 30 minutes a day for 5 days a week or a total of 150 min/week  Keep exercise journal  Small changes in activity makes a big difference ie take stairs instead of elevators or park farther away from you destination  Try to weigh yourself daily at same time of day and keep journal  Keep food journal   Sleep hygine  Mindful meditation for  15mins a day 5 days a week. Insight timer a good free walt.     Orders:    Comprehensive metabolic panel; Future    CBC and differential; Future    Hemoglobin A1C; Future

## 2024-10-14 NOTE — ASSESSMENT & PLAN NOTE
Lab Results   Component Value Date    CHOLESTEROL 189 07/19/2024    TRIG 65 07/19/2024    HDL 41 07/19/2024    LDLCALC 135 (H) 07/19/2024     Lab Results   Component Value Date    [id number] 0.953 07/19/2024    FREET4 0.93 07/19/2024     Lab Results   Component Value Date    HGBA1C 5.5 07/19/2024      PLAN  Lifestyle management with low fast diet to help LDL Cholesterol

## 2024-10-15 ENCOUNTER — OFFICE VISIT (OUTPATIENT)
Dept: INTERNAL MEDICINE CLINIC | Facility: CLINIC | Age: 44
End: 2024-10-15

## 2024-10-15 VITALS
HEART RATE: 61 BPM | HEIGHT: 73 IN | TEMPERATURE: 97.6 F | WEIGHT: 215 LBS | BODY MASS INDEX: 28.49 KG/M2 | DIASTOLIC BLOOD PRESSURE: 68 MMHG | RESPIRATION RATE: 16 BRPM | OXYGEN SATURATION: 98 % | SYSTOLIC BLOOD PRESSURE: 102 MMHG

## 2024-10-15 DIAGNOSIS — M25.472 BILATERAL SWELLING OF FEET AND ANKLES: ICD-10-CM

## 2024-10-15 DIAGNOSIS — M25.474 BILATERAL SWELLING OF FEET AND ANKLES: ICD-10-CM

## 2024-10-15 DIAGNOSIS — Z00.00 ANNUAL PHYSICAL EXAM: ICD-10-CM

## 2024-10-15 DIAGNOSIS — N18.2 STAGE 2 CHRONIC KIDNEY DISEASE: Primary | ICD-10-CM

## 2024-10-15 DIAGNOSIS — Z11.59 NEED FOR HEPATITIS C SCREENING TEST: ICD-10-CM

## 2024-10-15 DIAGNOSIS — M10.9 GOUT INVOLVING TOE, UNSPECIFIED CAUSE, UNSPECIFIED CHRONICITY, UNSPECIFIED LATERALITY: ICD-10-CM

## 2024-10-15 DIAGNOSIS — Z12.11 SCREENING FOR COLON CANCER: ICD-10-CM

## 2024-10-15 DIAGNOSIS — M25.471 BILATERAL SWELLING OF FEET AND ANKLES: ICD-10-CM

## 2024-10-15 DIAGNOSIS — M25.475 BILATERAL SWELLING OF FEET AND ANKLES: ICD-10-CM

## 2024-10-15 PROCEDURE — 99396 PREV VISIT EST AGE 40-64: CPT

## 2024-10-15 NOTE — PROGRESS NOTES
Adult Annual Physical  Name: Eran Live      : 1980      MRN: [id number]  Encounter Provider: Rachid Martini MD  Encounter Date: 10/15/2024   Encounter department: [facility] INTERNAL MEDICINE [place]    Assessment & Plan  Stage 2 chronic kidney disease  Lab Results   Component Value Date    EGFR 56 2024    EGFR 60 2024    EGFR 67 10/05/2021    CREATININE 1.48 (H) 2024    CREATININE 1.39 (H) 2024    CREATININE 1.48 (H) 10/05/2021     Lab Results   Component Value Date    HGBA1C 5.5 2024     Lab Results   Component Value Date    BNP 4 2024     01YPH63: Albumin Cr Ratio WNL  11MNM42: Uric Acid 6.2  48RRA55: UA Normal  US Kidney unremarkable according to nephrology note however could not find recent report   Baseline sCr. 1.4-1.5.    Now following [facility] w/ last visit [id number]     SYMPTOMS: Currently no swelling     Uric acid nephropathy vs atherosclerotic nephropathy vs other vs Nephrotic nephritic syndrome  Nephrotic nephritic less likely since urine protein not elevated.      Plan:  Avoid hypoperfusion of the kidneys, minimize nephrotoxins  Control modifiable risk factors, BP Glucose  Avoid nephrotoxic agents like NSAIDS RAAS Blockers  Continue allopurinol 300mg PO daily w/ goal uric acid <6  Consider ECHO if symptoms do not resolve for potential cardiac evolvement   Strict lifestyle management  Life style modifications  Eat real food, not too much, mostly plants  Avoid processed foods  DASH Diet  Limit salt intake: education given on how to read nutriton labels to find salt (ie listed as Salt, NaCl, Sodium). Pt was educated on types of foods and drinks with high salt content.   Do not drink your calories  Eat a piece of fruit or vegetable 30 mins prior to meals  Avoid sugar and sugar substitutes  Limit meat products  Per AHA guidelines, recommend moderate-vigorous intensity exercise for 30 minutes a day for 5 days a week or a total of 150  min/week  Keep exercise journal  Small changes in activity makes a big difference ie take stairs instead of elevators or park farther away from you destination  Try to weigh yourself daily at same time of day and keep journal  Keep food journal   Sleep hygine  Mindful meditation for 15mins a day 5 days a week. Insight timer a good free walt.     Orders:    Comprehensive metabolic panel; Future    CBC and differential; Future    Hemoglobin A1C; Future    Bilateral swelling of feet and ankles  Bilateral swelling of feet and ankles at previous visit  Lab Results   Component Value Date    BNP 4 07/19/2024     Currently no swelling some stiffness     PLAN:    Possibly related to gout.    No evidence of systemic fluid overload.  Advised low-sodium diet and elevation of legs.  Continue compression stalking   Continue to follow nephrology  Consider ECHO if symptoms dont resolve         Gout involving toe, unspecified cause, unspecified chronicity, unspecified laterality    Dx around 2010  Pt had recent ED visit d/t pain and swelling.   Was given methylprednisolone as pt has CKD and needs to avoid NSAIDS  Pt started on allopurinol 200mg PO Daily in 2021 by Dr. Carrero  Started following [facility] PCP [place] and Nephrology MAY24  S/p Methylprednisolone terapy pack ordered [id number]  29YCZ47: Uric Acid 6.2  01IVA33: UA Normal  2018 RF and CCP Neg  FLARES: First metatarsal right foot w/ pain swelling of the legs   PLAN:  Low purine diet  Continue allopurinol 300mg PO daily   Continue follow up visit w/ nephrology   Compression stockings  Pt given handout about low purine diet at previous visit.   NSAIDS contraindicated in setting of CKD  Would avoid diuretics as pt has low BP and CKD         Annual physical exam  Lab Results   Component Value Date    CHOLESTEROL 189 07/19/2024    TRIG 65 07/19/2024    HDL 41 07/19/2024    LDLCALC 135 (H) 07/19/2024     Lab Results   Component Value Date    [id number] 0.953 07/19/2024    FREET4  0.93 07/19/2024     Lab Results   Component Value Date    HGBA1C 5.5 07/19/2024      PLAN  Lifestyle management with low fast diet to help LDL Cholesterol          Screening for colon cancer  Pt 43 this is now due  Pt yet to make appointment w/ GI  Referral provided         Need for hepatitis C screening test    Orders:    Hepatitis C Antibody; Future    HEPATITIS C AB W/RFL RNA, PCR W/RFL GENOTYPE,LIPA ([facility]); Future    Immunizations and preventive care screenings were discussed with patient today. Appropriate education was printed on patient's after visit summary.    Discussed risks and benefits of prostate cancer screening. We discussed the controversial history of PSA screening for prostate cancer in the [place] as well as the risk of over detection and over treatment of prostate cancer by way of PSA screening.  The patient understands that PSA blood testing is an imperfect way to screen for prostate cancer and that elevated PSA levels in the blood may also be caused by infection, inflammation, prostatic trauma or manipulation, urological procedures, or by benign prostatic enlargement.    The role of the digital rectal examination in prostate cancer screening was also discussed and I discussed with him that there is large interobserver variability in the findings of digital rectal examination.    Counseling:  Alcohol/drug use: discussed moderation in alcohol intake, the recommendations for healthy alcohol use, and avoidance of illicit drug use.         History of Present Illness     Adult Annual Physical:  Patient presents for annual physical. Doesn't eat 3 servings a day.   Eats a lot of candy bars  No cardio but does strengths training.     Diet and Physical Activity:  - Diet/Nutrition: frequent junk food.  - Exercise: strength training exercises.    General Health:  - Sleep: 7-8 hours of sleep on average.  - Hearing: normal hearing bilateral ears.  - Vision:. Pt notes far sited may need glasses soon  -  "Dental: no dental visits for > 1 year, brushes teeth once daily and does not floss.    /GYN Health:    - History of STDs: no     Health:  - History of STDs: no.     Advanced Care Planning:  - Has an advanced directive?: no    - Has a durable medical POA?: no      Review of Systems   Constitutional:  Negative for fever.   HENT:  Positive for congestion, sinus pressure and sneezing.    Eyes:  Positive for redness.   Respiratory: Negative.     Cardiovascular:  Negative for chest pain.   Gastrointestinal: Negative.    Genitourinary: Negative.    Musculoskeletal:  Positive for myalgias. Negative for joint swelling.     Pertinent Medical History       Medical History Reviewed by provider this encounter:       Current Outpatient Medications on File Prior to Visit   Medication Sig Dispense Refill    acetaminophen (TYLENOL) 325 mg tablet Take 325 mg by mouth every 6 (six) hours as needed for mild pain As needed      allopurinol (ZYLOPRIM) 100 mg tablet TAKE 3 TABLETS BY MOUTH DAILY 270 tablet 1     No current facility-administered medications on file prior to visit.      Social History     Tobacco Use    Smoking status: Never    Smokeless tobacco: Never   Vaping Use    Vaping status: Never Used   Substance and Sexual Activity    Alcohol use: No    Drug use: No    Sexual activity: Not on file       Objective     /68 (BP Location: Left arm, Patient Position: Sitting, Cuff Size: Adult)   Pulse 61   Temp 97.6 °F (36.4 °C) (Tympanic)   Resp 16   Ht 6' 1\" (1.854 m)   Wt 97.5 kg (215 lb)   SpO2 98%   BMI 28.37 kg/m²     Physical Exam  Vitals and nursing note reviewed.   Constitutional:       General: He is not in acute distress.     Appearance: Normal appearance. He is well-developed and normal weight. He is not ill-appearing, toxic-appearing or diaphoretic.   HENT:      Head: Normocephalic and atraumatic.      Nose: Congestion present.      Mouth/Throat:      Mouth: Mucous membranes are moist.      Pharynx: No " oropharyngeal exudate.   Eyes:      General:         Right eye: No discharge.         Left eye: No discharge.      Conjunctiva/sclera: Conjunctivae normal.      Comments: Left eye 30/20  Right eye 15/30   Cardiovascular:      Rate and Rhythm: Normal rate and regular rhythm.      Heart sounds: No murmur heard.  Pulmonary:      Effort: Pulmonary effort is normal. No respiratory distress.      Breath sounds: Normal breath sounds. No stridor. No wheezing, rhonchi or rales.   Abdominal:      General: There is no distension.      Palpations: Abdomen is soft. There is no mass.      Tenderness: There is no abdominal tenderness. There is no right CVA tenderness, left CVA tenderness, guarding or rebound.      Hernia: No hernia is present.   Musculoskeletal:         General: No swelling.      Cervical back: Normal range of motion and neck supple. No rigidity or tenderness.      Right lower leg: Edema present.      Left lower leg: Edema present.      Comments: Mild non pitting edema lower extremities bilaterally    Lymphadenopathy:      Cervical: No cervical adenopathy.   Skin:     General: Skin is warm and dry.      Capillary Refill: Capillary refill takes less than 2 seconds.   Neurological:      Mental Status: He is alert and oriented to person, place, and time.   Psychiatric:         Mood and Affect: Mood normal.       Nutrition Assessment and Intervention:     Reviewed food recall journal    Recommended completion of food recall journal    Ordered nutritional assessment labs    New Nutrition Prescription completed with patient      Other interventions: Dont drink calories       Physical Activity Assessment and Intervention:    Activity journal completion recommended      Other interventions: With gout suggested yoga    Emotional and Mental Well-being, Sleep, Connectedness Assessment and Intervention:    Sleep/stress assessment performed      Tobacco and Toxic Substance Assessment and Intervention:     Tobacco use screening  performed    Alcohol and drug use screening performed

## 2024-11-14 PROBLEM — Z12.11 SCREENING FOR COLON CANCER: Status: RESOLVED | Noted: 2024-07-02 | Resolved: 2024-11-14

## 2025-02-28 ENCOUNTER — APPOINTMENT (OUTPATIENT)
Dept: LAB | Facility: CLINIC | Age: 45
End: 2025-02-28
Payer: COMMERCIAL

## 2025-02-28 ENCOUNTER — TELEPHONE (OUTPATIENT)
Dept: NEPHROLOGY | Facility: CLINIC | Age: 45
End: 2025-02-28

## 2025-02-28 DIAGNOSIS — Z11.59 NEED FOR HEPATITIS C SCREENING TEST: ICD-10-CM

## 2025-02-28 DIAGNOSIS — N18.2 STAGE 2 CHRONIC KIDNEY DISEASE: ICD-10-CM

## 2025-02-28 LAB
ALBUMIN SERPL BCG-MCNC: 4.4 G/DL (ref 3.5–5)
ANION GAP SERPL CALCULATED.3IONS-SCNC: 8 MMOL/L (ref 4–13)
BUN SERPL-MCNC: 19 MG/DL (ref 5–25)
CALCIUM SERPL-MCNC: 9.3 MG/DL (ref 8.4–10.2)
CHLORIDE SERPL-SCNC: 103 MMOL/L (ref 96–108)
CO2 SERPL-SCNC: 28 MMOL/L (ref 21–32)
CREAT SERPL-MCNC: 1.37 MG/DL (ref 0.6–1.3)
CREAT UR-MCNC: 94 MG/DL
ERYTHROCYTE [DISTWIDTH] IN BLOOD BY AUTOMATED COUNT: 13.6 % (ref 11.6–15.1)
GFR SERPL CREATININE-BSD FRML MDRD: 61 ML/MIN/1.73SQ M
GLUCOSE P FAST SERPL-MCNC: 81 MG/DL (ref 65–99)
HCT VFR BLD AUTO: 48.5 % (ref 36.5–49.3)
HCV AB SER QL: NORMAL
HGB BLD-MCNC: 15.8 G/DL (ref 12–17)
MAGNESIUM SERPL-MCNC: 1.8 MG/DL (ref 1.9–2.7)
MCH RBC QN AUTO: 28.1 PG (ref 26.8–34.3)
MCHC RBC AUTO-ENTMCNC: 32.6 G/DL (ref 31.4–37.4)
MCV RBC AUTO: 86 FL (ref 82–98)
MICROALBUMIN UR-MCNC: <7 MG/L
PHOSPHATE SERPL-MCNC: 2.9 MG/DL (ref 2.7–4.5)
PLATELET # BLD AUTO: 176 THOUSANDS/UL (ref 149–390)
PMV BLD AUTO: 11.1 FL (ref 8.9–12.7)
POTASSIUM SERPL-SCNC: 4.1 MMOL/L (ref 3.5–5.3)
RBC # BLD AUTO: 5.63 MILLION/UL (ref 3.88–5.62)
SODIUM SERPL-SCNC: 139 MMOL/L (ref 135–147)
WBC # BLD AUTO: 4.03 THOUSAND/UL (ref 4.31–10.16)

## 2025-02-28 PROCEDURE — 85027 COMPLETE CBC AUTOMATED: CPT

## 2025-02-28 PROCEDURE — 82570 ASSAY OF URINE CREATININE: CPT

## 2025-02-28 PROCEDURE — 86803 HEPATITIS C AB TEST: CPT

## 2025-02-28 PROCEDURE — 36415 COLL VENOUS BLD VENIPUNCTURE: CPT

## 2025-02-28 PROCEDURE — 82043 UR ALBUMIN QUANTITATIVE: CPT

## 2025-02-28 PROCEDURE — 80069 RENAL FUNCTION PANEL: CPT

## 2025-02-28 PROCEDURE — 83735 ASSAY OF MAGNESIUM: CPT

## 2025-02-28 NOTE — TELEPHONE ENCOUNTER
Spoke with patient's mother, Nikki, reminding him to go for lab work before his appt on 3/6.  She expressed understanding and thanked us for the call.

## 2025-03-03 LAB — MISCELLANEOUS LAB TEST RESULT: NORMAL

## 2025-03-05 NOTE — PROGRESS NOTES
Name: Eran Live      : 1980      MRN: [id number]  Encounter Provider: YARY Silverman  Encounter Date: 3/6/2025   Encounter department: [facility]  :  Assessment & Plan  Stage 2 chronic kidney disease  Lab Results   Component Value Date    EGFR 61 2025    EGFR 56 2024    EGFR 60 2024    CREATININE 1.37 (H) 2025    CREATININE 1.48 (H) 2024    CREATININE 1.39 (H) 2024     Chronic kidney disease, stage II:  Baseline creatinine: 1.4-1.6.  Prior Cystatin C was measured and it was found to be 1.26 and at that time serum creatinine was 1.6.  Serum creatinine measurement/GFR may be inaccurate in the setting of higher muscle mass.  Renal function seems to have stabilized.  He is no longer using NSAIDs for approximately 3 years.  Current creatinine 1.37.  eGFR 61  Electrolytes/acid-base acceptable low magnesium: Magnesium level 1.8  [place] urinalysis  Prior imaging unrevealing  Blood pressure is great.  Etiology:   According to Dr. Jo's assessment, etiology unclear but possibly related to arterio and arteriolar nephrosclerosis versus uric acid nephropathy.(the diagnosis of note uric acid nephropathy can only be made with renal biopsy-no indication for biopsy at this time.  If renal function continues to worsen we will discuss with Jose)  Eran also reports taking 2-4 Aleve for migraines every day for 1 year.  Stopped approximately 3 years ago.  On allopurinol 300 mg daily  Follow-up studies in approximately 3 months  If renal function remains stable follow-up in 6 months  Blood work and urine studies ordered.  Will recheck Cystatin C to reassess GFR  Orders:    Basic metabolic panel; Future    Uric acid; Future    Cystatin C With eGFR; Future    Albumin / creatinine urine ratio; Future    CBC; Future    Magnesium; Future    Renal function panel; Future    Urinalysis with microscopic; Future    Gout involving toe, unspecified cause,  unspecified chronicity, unspecified laterality  On allopurinol 300 mg daily  Goal uric acid level less than 6.    Last uric acid level 6.2 June 2024 improved from prior levels which were between 8.5-9.4  Recheck uric acid level with next labs  Asymptomatic  Given information on low purine diet  Orders:    Basic metabolic panel; Future    Uric acid; Future    Albumin / creatinine urine ratio; Future    CBC; Future    Magnesium; Future    Renal function panel; Future    Urinalysis with microscopic; Future    Bilateral swelling of feet and ankles  Doing well at this time.  No ankle pain.  No swelling  Orders:    Basic metabolic panel; Future    Uric acid; Future    Albumin / creatinine urine ratio; Future    CBC; Future    Magnesium; Future    Renal function panel; Future    Urinalysis with microscopic; Future    Low magnesium level  Magnesium level slightly low  Given information on magnesium content of foods to increase magnesium intake.  If magnesium level remains low we need to start a supplement.           Patient Instructions   Enck you for the kind visit today you were seen for continued monitoring of chronic kidney disease.  Kidney disease is very mild and we will continue to monitor closely.  Please continue to avoid NSAIDs such as Motrin Aleve ibuprofen and Advil.  You can take Tylenol for mild pain.    Recommendations:  Magnesium was slightly low.  See below for magnesium containing foods  Low purine diet.  See below for diet recommendations  Blood work in approximately 3 months.  We will check basic metabolic panel and Cystatin C in order to assess the glomerular filtration rate.  Also we will check the uric acid to make sure that allopurinol dose is appropriate.  Goal uric acid level is less than 6  Follow-up in 6 months.  Blood work and urine studies prior to the appointment in 6 months.  Continue to avoid Aleve Motrin Advil and ibuprofen type medications.  If you are unsure about the safety of a  medication call our office or asked the pharmacist.  Foods high in magnesium are nuts and seeds such as almonds, cashews, flaxseed, peanuts, pumpkin seeds.  Legumes are also high in magnesium such as black beans, lima beans, Edamame,.  Other sources of magnesium shredded wheat, milk, yogurt, spinach, dark chocolate..            History of Present Illness     Eran Live is a 44 y.o. male who presents for follow-up 44-year-old male who was seen by Dr. Jo in consultation 6/26/2024 for evaluation of kidney function.  Initially, he was seen in consultation by Dr. Bland July 2019 due to abnormal renal function studies. Ankle swelling.  At last office visit thought to be related to gout.  It was no evidence of volume overload.  Years ago significant use of NSAIDs due to migraines.  He had a very stressful job and has left that job and no longer has headaches and has not been using NSAIDs.    History obtained from: patient    Review of Systems   Constitutional:  Negative for activity change, appetite change, chills, diaphoresis, fatigue, fever and unexpected weight change.   HENT:  Negative for congestion.    Eyes:  Negative for visual disturbance.   Respiratory:  Negative for cough and shortness of breath.    Cardiovascular:  Negative for chest pain and palpitations.   Gastrointestinal:  Negative for abdominal pain, constipation, diarrhea, nausea and vomiting.   Endocrine: Negative.    Genitourinary:  Negative for dysuria and hematuria.   Musculoskeletal:  Negative for arthralgias and back pain.   Skin:  Negative for color change and rash.   Neurological:  Negative for dizziness, seizures, syncope, speech difficulty, weakness, numbness and headaches.   Hematological:  Does not bruise/bleed easily.   Psychiatric/Behavioral:  The patient is not nervous/anxious.    All other systems reviewed and are negative.           Current Outpatient Medications on File Prior to Visit   Medication Sig Dispense Refill     "acetaminophen (TYLENOL) 325 mg tablet Take 325 mg by mouth every 6 (six) hours as needed for mild pain As needed      allopurinol (ZYLOPRIM) 100 mg tablet TAKE 3 TABLETS BY MOUTH DAILY 270 tablet 1     No current facility-administered medications on file prior to visit.     Objective   /70 (BP Location: Left arm, Patient Position: Sitting, Cuff Size: Large)   Pulse 65   Ht 6' 1\" (1.854 m)   Wt 102 kg (224 lb)   SpO2 96%   BMI 29.55 kg/m²      Physical Exam  Vitals and nursing note reviewed.   Constitutional:       General: He is not in acute distress.     Appearance: He is well-developed. He is not ill-appearing, toxic-appearing or diaphoretic.   HENT:      Head: Normocephalic and atraumatic.      Nose: Nose normal. No congestion.      Mouth/Throat:      Mouth: Mucous membranes are moist.      Pharynx: No oropharyngeal exudate.   Eyes:      Extraocular Movements: Extraocular movements intact.      Conjunctiva/sclera: Conjunctivae normal.   Neck:      Vascular: No carotid bruit.   Cardiovascular:      Rate and Rhythm: Normal rate and regular rhythm.      Heart sounds: No murmur heard.     No friction rub. No gallop.   Pulmonary:      Effort: Pulmonary effort is normal. No respiratory distress.      Breath sounds: Normal breath sounds. No stridor. No wheezing, rhonchi or rales.   Abdominal:      General: Bowel sounds are normal. There is no distension.      Palpations: Abdomen is soft.      Tenderness: There is no abdominal tenderness.   Musculoskeletal:         General: No swelling, tenderness or signs of injury.      Cervical back: Normal range of motion and neck supple. No rigidity or tenderness.      Right lower leg: No edema.      Left lower leg: No edema.   Skin:     General: Skin is warm and dry.      Capillary Refill: Capillary refill takes less than 2 seconds.      Coloration: Skin is not jaundiced or pale.      Findings: No erythema or rash.   Neurological:      Mental Status: He is alert and " oriented to person, place, and time.   Psychiatric:         Mood and Affect: Mood normal.         Behavior: Behavior normal.         Thought Content: Thought content normal.           Laboratory Results:  Results from last 7 days   Lab Units 02/28/25  1513   WBC Thousand/uL 4.03*   HEMOGLOBIN g/dL 15.8   HEMATOCRIT % 48.5   PLATELETS Thousands/uL 176   POTASSIUM mmol/L 4.1   CHLORIDE mmol/L 103   CO2 mmol/L 28   BUN mg/dL 19   CREATININE mg/dL 1.37*   CALCIUM mg/dL 9.3   MAGNESIUM mg/dL 1.8*   PHOSPHORUS mg/dL 2.9       Results for orders placed or performed in visit on 02/28/25   CBC   Result Value Ref Range    WBC 4.03 (L) 4.31 - 10.16 Thousand/uL    RBC 5.63 (H) 3.88 - 5.62 Million/uL    Hemoglobin 15.8 12.0 - 17.0 g/dL    Hematocrit 48.5 36.5 - 49.3 %    MCV 86 82 - 98 fL    MCH 28.1 26.8 - 34.3 pg    MCHC 32.6 31.4 - 37.4 g/dL    RDW 13.6 11.6 - 15.1 %    Platelets 176 149 - 390 Thousands/uL    MPV 11.1 8.9 - 12.7 fL   Magnesium   Result Value Ref Range    Magnesium 1.8 (L) 1.9 - 2.7 mg/dL   Renal function panel   Result Value Ref Range    Albumin 4.4 3.5 - 5.0 g/dL    Calcium 9.3 8.4 - 10.2 mg/dL    Phosphorus 2.9 2.7 - 4.5 mg/dL    BUN 19 5 - 25 mg/dL    Creatinine 1.37 (H) 0.60 - 1.30 mg/dL    Sodium 139 135 - 147 mmol/L    Potassium 4.1 3.5 - 5.3 mmol/L    Chloride 103 96 - 108 mmol/L    CO2 28 21 - 32 mmol/L    ANION GAP 8 4 - 13 mmol/L    eGFR 61 ml/min/1.73sq m    Glucose, Fasting 81 65 - 99 mg/dL   Albumin / creatinine urine ratio   Result Value Ref Range    Creatinine, Ur 94.0 Reference range not established. mg/dL    Albumin,U,Random <7.0 <20.0 mg/L    Albumin Creat Ratio     Hepatitis C Antibody   Result Value Ref Range    Hepatitis C Ab Non-reactive Non-Reactive   HEPATITIS C AB W/RFL RNA, PCR W/RFL GENOTYPE,LIPA ([facility])   Result Value Ref Range    Miscellaneous Lab Test Result see written report

## 2025-03-06 ENCOUNTER — OFFICE VISIT (OUTPATIENT)
Dept: NEPHROLOGY | Facility: CLINIC | Age: 45
End: 2025-03-06

## 2025-03-06 VITALS
BODY MASS INDEX: 29.69 KG/M2 | SYSTOLIC BLOOD PRESSURE: 114 MMHG | DIASTOLIC BLOOD PRESSURE: 70 MMHG | HEART RATE: 65 BPM | HEIGHT: 73 IN | OXYGEN SATURATION: 96 % | WEIGHT: 224 LBS

## 2025-03-06 DIAGNOSIS — M25.472 BILATERAL SWELLING OF FEET AND ANKLES: ICD-10-CM

## 2025-03-06 DIAGNOSIS — R79.0 LOW MAGNESIUM LEVEL: ICD-10-CM

## 2025-03-06 DIAGNOSIS — M25.474 BILATERAL SWELLING OF FEET AND ANKLES: ICD-10-CM

## 2025-03-06 DIAGNOSIS — M25.471 BILATERAL SWELLING OF FEET AND ANKLES: ICD-10-CM

## 2025-03-06 DIAGNOSIS — N18.2 STAGE 2 CHRONIC KIDNEY DISEASE: Primary | ICD-10-CM

## 2025-03-06 DIAGNOSIS — M10.9 GOUT INVOLVING TOE, UNSPECIFIED CAUSE, UNSPECIFIED CHRONICITY, UNSPECIFIED LATERALITY: ICD-10-CM

## 2025-03-06 DIAGNOSIS — M25.475 BILATERAL SWELLING OF FEET AND ANKLES: ICD-10-CM

## 2025-03-06 PROCEDURE — 99214 OFFICE O/P EST MOD 30 MIN: CPT | Performed by: NURSE PRACTITIONER

## 2025-03-06 NOTE — ASSESSMENT & PLAN NOTE
On allopurinol 300 mg daily  Goal uric acid level less than 6.    Last uric acid level 6.2 June 2024 improved from prior levels which were between 8.5-9.4  Recheck uric acid level with next labs  Asymptomatic  Given information on low purine diet  Orders:    Basic metabolic panel; Future    Uric acid; Future    Albumin / creatinine urine ratio; Future    CBC; Future    Magnesium; Future    Renal function panel; Future    Urinalysis with microscopic; Future

## 2025-03-06 NOTE — ASSESSMENT & PLAN NOTE
Lab Results   Component Value Date    EGFR 61 02/28/2025    EGFR 56 06/28/2024    EGFR 60 05/16/2024    CREATININE 1.37 (H) 02/28/2025    CREATININE 1.48 (H) 06/28/2024    CREATININE 1.39 (H) 05/16/2024     Chronic kidney disease, stage II:  Baseline creatinine: 1.4-1.6.  Prior Cystatin C was measured and it was found to be 1.26 and at that time serum creatinine was 1.6.  Serum creatinine measurement/GFR may be inaccurate in the setting of higher muscle mass.  Renal function seems to have stabilized.  He is no longer using NSAIDs for approximately 3 years.  Current creatinine 1.37.  eGFR 61  Electrolytes/acid-base acceptable low magnesium: Magnesium level 1.8  [place] urinalysis  Prior imaging unrevealing  Blood pressure is great.  Etiology:   According to Dr. Jo's assessment, etiology unclear but possibly related to arterio and arteriolar nephrosclerosis versus uric acid nephropathy.(the diagnosis of note uric acid nephropathy can only be made with renal biopsy-no indication for biopsy at this time.  If renal function continues to worsen we will discuss with Jose)  Eran also reports taking 2-4 Aleve for migraines every day for 1 year.  Stopped approximately 3 years ago.  On allopurinol 300 mg daily  Follow-up studies in approximately 3 months  If renal function remains stable follow-up in 6 months  Blood work and urine studies ordered.  Will recheck Cystatin C to reassess GFR  Orders:    Basic metabolic panel; Future    Uric acid; Future    Cystatin C With eGFR; Future    Albumin / creatinine urine ratio; Future    CBC; Future    Magnesium; Future    Renal function panel; Future    Urinalysis with microscopic; Future

## 2025-03-06 NOTE — ASSESSMENT & PLAN NOTE
Magnesium level slightly low  Given information on magnesium content of foods to increase magnesium intake.  If magnesium level remains low we need to start a supplement.

## 2025-03-06 NOTE — PATIENT INSTRUCTIONS
Enck you for the kind visit today you were seen for continued monitoring of chronic kidney disease.  Kidney disease is very mild and we will continue to monitor closely.  Please continue to avoid NSAIDs such as Motrin Aleve ibuprofen and Advil.  You can take Tylenol for mild pain.    Recommendations:  Magnesium was slightly low.  See below for magnesium containing foods  Low purine diet.  See below for diet recommendations  Blood work in approximately 3 months.  We will check basic metabolic panel and Cystatin C in order to assess the glomerular filtration rate.  Also we will check the uric acid to make sure that allopurinol dose is appropriate.  Goal uric acid level is less than 6  Follow-up in 6 months.  Blood work and urine studies prior to the appointment in 6 months.  Continue to avoid Aleve Motrin Advil and ibuprofen type medications.  If you are unsure about the safety of a medication call our office or asked the pharmacist.  Foods high in magnesium are nuts and seeds such as almonds, cashews, flaxseed, peanuts, pumpkin seeds.  Legumes are also high in magnesium such as black beans, lima beans, Edamame,.  Other sources of magnesium shredded wheat, milk, yogurt, spinach, dark chocolate..

## 2025-03-06 NOTE — ASSESSMENT & PLAN NOTE
Doing well at this time.  No ankle pain.  No swelling  Orders:    Basic metabolic panel; Future    Uric acid; Future    Albumin / creatinine urine ratio; Future    CBC; Future    Magnesium; Future    Renal function panel; Future    Urinalysis with microscopic; Future

## 2025-03-07 DIAGNOSIS — M10.9 GOUT INVOLVING TOE, UNSPECIFIED CAUSE, UNSPECIFIED CHRONICITY, UNSPECIFIED LATERALITY: ICD-10-CM

## 2025-03-07 RX ORDER — ALLOPURINOL 100 MG/1
300 TABLET ORAL DAILY
Qty: 270 TABLET | Refills: 1 | Status: SHIPPED | OUTPATIENT
Start: 2025-03-07

## 2025-05-12 ENCOUNTER — APPOINTMENT (OUTPATIENT)
Dept: LAB | Facility: CLINIC | Age: 45
End: 2025-05-12
Payer: COMMERCIAL

## 2025-05-12 DIAGNOSIS — M25.471 BILATERAL SWELLING OF FEET AND ANKLES: ICD-10-CM

## 2025-05-12 DIAGNOSIS — M25.472 BILATERAL SWELLING OF FEET AND ANKLES: ICD-10-CM

## 2025-05-12 DIAGNOSIS — N18.2 STAGE 2 CHRONIC KIDNEY DISEASE: ICD-10-CM

## 2025-05-12 DIAGNOSIS — M25.474 BILATERAL SWELLING OF FEET AND ANKLES: ICD-10-CM

## 2025-05-12 DIAGNOSIS — M25.475 BILATERAL SWELLING OF FEET AND ANKLES: ICD-10-CM

## 2025-05-12 DIAGNOSIS — M10.9 GOUT INVOLVING TOE, UNSPECIFIED CAUSE, UNSPECIFIED CHRONICITY, UNSPECIFIED LATERALITY: ICD-10-CM

## 2025-05-12 LAB
ALBUMIN SERPL BCG-MCNC: 4.1 G/DL (ref 3.5–5)
ALP SERPL-CCNC: 68 U/L (ref 34–104)
ALT SERPL W P-5'-P-CCNC: 31 U/L (ref 7–52)
ANION GAP SERPL CALCULATED.3IONS-SCNC: 4 MMOL/L (ref 4–13)
AST SERPL W P-5'-P-CCNC: 27 U/L (ref 13–39)
BACTERIA UR QL AUTO: NORMAL /HPF
BASOPHILS # BLD AUTO: 0.02 THOUSANDS/ÂΜL (ref 0–0.1)
BASOPHILS NFR BLD AUTO: 1 % (ref 0–1)
BILIRUB SERPL-MCNC: 0.67 MG/DL (ref 0.2–1)
BILIRUB UR QL STRIP: NEGATIVE
BUN SERPL-MCNC: 17 MG/DL (ref 5–25)
CALCIUM SERPL-MCNC: 8.9 MG/DL (ref 8.4–10.2)
CHLORIDE SERPL-SCNC: 104 MMOL/L (ref 96–108)
CLARITY UR: CLEAR
CO2 SERPL-SCNC: 29 MMOL/L (ref 21–32)
COLOR UR: NORMAL
CREAT SERPL-MCNC: 1.39 MG/DL (ref 0.6–1.3)
CREAT UR-MCNC: 83.7 MG/DL
EOSINOPHIL # BLD AUTO: 0.11 THOUSAND/ÂΜL (ref 0–0.61)
EOSINOPHIL NFR BLD AUTO: 3 % (ref 0–6)
ERYTHROCYTE [DISTWIDTH] IN BLOOD BY AUTOMATED COUNT: 13.4 % (ref 11.6–15.1)
EST. AVERAGE GLUCOSE BLD GHB EST-MCNC: 117 MG/DL
GFR SERPL CREATININE-BSD FRML MDRD: 60 ML/MIN/1.73SQ M
GLUCOSE P FAST SERPL-MCNC: 92 MG/DL (ref 65–99)
GLUCOSE UR STRIP-MCNC: NEGATIVE MG/DL
HBA1C MFR BLD: 5.7 %
HCT VFR BLD AUTO: 48.5 % (ref 36.5–49.3)
HGB BLD-MCNC: 15.7 G/DL (ref 12–17)
HGB UR QL STRIP.AUTO: NEGATIVE
IMM GRANULOCYTES # BLD AUTO: 0.01 THOUSAND/UL (ref 0–0.2)
IMM GRANULOCYTES NFR BLD AUTO: 0 % (ref 0–2)
KETONES UR STRIP-MCNC: NEGATIVE MG/DL
LEUKOCYTE ESTERASE UR QL STRIP: NEGATIVE
LYMPHOCYTES # BLD AUTO: 1.14 THOUSANDS/ÂΜL (ref 0.6–4.47)
LYMPHOCYTES NFR BLD AUTO: 27 % (ref 14–44)
MCH RBC QN AUTO: 28.1 PG (ref 26.8–34.3)
MCHC RBC AUTO-ENTMCNC: 32.4 G/DL (ref 31.4–37.4)
MCV RBC AUTO: 87 FL (ref 82–98)
MICROALBUMIN UR-MCNC: <7 MG/L
MONOCYTES # BLD AUTO: 0.47 THOUSAND/ÂΜL (ref 0.17–1.22)
MONOCYTES NFR BLD AUTO: 11 % (ref 4–12)
NEUTROPHILS # BLD AUTO: 2.44 THOUSANDS/ÂΜL (ref 1.85–7.62)
NEUTS SEG NFR BLD AUTO: 58 % (ref 43–75)
NITRITE UR QL STRIP: NEGATIVE
NON-SQ EPI CELLS URNS QL MICRO: NORMAL /HPF
NRBC BLD AUTO-RTO: 0 /100 WBCS
PH UR STRIP.AUTO: 5.5 [PH]
PLATELET # BLD AUTO: 170 THOUSANDS/UL (ref 149–390)
PMV BLD AUTO: 11.2 FL (ref 8.9–12.7)
POTASSIUM SERPL-SCNC: 4.1 MMOL/L (ref 3.5–5.3)
PROT SERPL-MCNC: 7.2 G/DL (ref 6.4–8.4)
PROT UR STRIP-MCNC: NEGATIVE MG/DL
RBC # BLD AUTO: 5.58 MILLION/UL (ref 3.88–5.62)
RBC #/AREA URNS AUTO: NORMAL /HPF
SODIUM SERPL-SCNC: 137 MMOL/L (ref 135–147)
SP GR UR STRIP.AUTO: 1.01 (ref 1–1.03)
URATE SERPL-MCNC: 5.4 MG/DL (ref 3.5–8.5)
UROBILINOGEN UR STRIP-ACNC: <2 MG/DL
WBC # BLD AUTO: 4.19 THOUSAND/UL (ref 4.31–10.16)
WBC #/AREA URNS AUTO: NORMAL /HPF

## 2025-05-12 PROCEDURE — 82610 CYSTATIN C: CPT

## 2025-05-12 PROCEDURE — 82570 ASSAY OF URINE CREATININE: CPT

## 2025-05-12 PROCEDURE — 81001 URINALYSIS AUTO W/SCOPE: CPT

## 2025-05-12 PROCEDURE — 36415 COLL VENOUS BLD VENIPUNCTURE: CPT

## 2025-05-12 PROCEDURE — 83036 HEMOGLOBIN GLYCOSYLATED A1C: CPT

## 2025-05-12 PROCEDURE — 82043 UR ALBUMIN QUANTITATIVE: CPT

## 2025-05-12 PROCEDURE — 85025 COMPLETE CBC W/AUTO DIFF WBC: CPT

## 2025-05-12 PROCEDURE — 80053 COMPREHEN METABOLIC PANEL: CPT

## 2025-05-12 PROCEDURE — 84550 ASSAY OF BLOOD/URIC ACID: CPT

## 2025-05-12 NOTE — ASSESSMENT & PLAN NOTE
Lab Results   Component Value Date    EGFR 60 05/12/2025    EGFR 63 05/12/2025    EGFR 61 02/28/2025    CREATININE 1.39 (H) 05/12/2025    CREATININE 1.37 (H) 02/28/2025    CREATININE 1.48 (H) 06/28/2024   Pt follows with Nephro  Currently Cr at baseline as is eGFR  Nephrology unsure of etiology NSIADS vs gout vs intrinsic no current plan to biosy.   Follow up in 6 months  Call if any symptoms of swelling or pain return as we can check kidney fucntion

## 2025-05-12 NOTE — ASSESSMENT & PLAN NOTE
Pt uric acid level now WNL  Pt swelling has drastically decreased  No longer using a oglesby    PLAN:  Low purine diet  Continue allopurinol 300mg PO Daily   Follow up in 6 months  Call if any symptoms of swelling or pain return as we can check kidney fucntion

## 2025-05-13 ENCOUNTER — RESULTS FOLLOW-UP (OUTPATIENT)
Dept: NEPHROLOGY | Facility: CLINIC | Age: 45
End: 2025-05-13

## 2025-05-13 ENCOUNTER — OFFICE VISIT (OUTPATIENT)
Dept: INTERNAL MEDICINE CLINIC | Facility: CLINIC | Age: 45
End: 2025-05-13

## 2025-05-13 VITALS
TEMPERATURE: 97.7 F | HEART RATE: 69 BPM | HEIGHT: 73 IN | BODY MASS INDEX: 29.95 KG/M2 | RESPIRATION RATE: 14 BRPM | DIASTOLIC BLOOD PRESSURE: 68 MMHG | OXYGEN SATURATION: 98 % | WEIGHT: 226 LBS | SYSTOLIC BLOOD PRESSURE: 100 MMHG

## 2025-05-13 DIAGNOSIS — N18.2 STAGE 2 CHRONIC KIDNEY DISEASE: ICD-10-CM

## 2025-05-13 DIAGNOSIS — M10.9 GOUT INVOLVING TOE, UNSPECIFIED CAUSE, UNSPECIFIED CHRONICITY, UNSPECIFIED LATERALITY: Primary | ICD-10-CM

## 2025-05-13 DIAGNOSIS — Z12.11 SCREENING FOR COLON CANCER: ICD-10-CM

## 2025-05-13 LAB
CYSTATIN C SERPL-MCNC: 1.22 MG/L (ref 0.6–1)
GFR/BSA.PRED SERPLBLD CYS-BASED-ARV: 63 ML/MIN/1.73

## 2025-05-13 PROCEDURE — 99212 OFFICE O/P EST SF 10 MIN: CPT

## 2025-05-13 NOTE — RESULT ENCOUNTER NOTE
Labs reviewed.  Please call Lashae and let him know that blood work looks very good right now labs are all stable.  All the components of the urinalysis are normal.  Even uric acid looks great at 5.4 which is at target.  As per our last visit he can follow-up in 3 more months with face-to-face visit.

## 2025-05-13 NOTE — ASSESSMENT & PLAN NOTE
Pt due for colon cancer screening. Pt previously ordered referral to GI  Pt was educated on importance of colon cancer screening

## 2025-05-13 NOTE — PROGRESS NOTES
"Name: Eran Live      : 1980      MRN: [id number]  Encounter Provider: Rachid Martini MD  Encounter Date: 2025   Encounter department: [facility] [place]  :  Assessment & Plan  Gout involving toe, unspecified cause, unspecified chronicity, unspecified laterality  Pt uric acid level now WNL  Pt swelling has drastically decreased  No longer using a oglesby    PLAN:  Low purine diet  Continue allopurinol 300mg PO Daily   Follow up in 6 months  Call if any symptoms of swelling or pain return as we can check kidney fucntion         Stage 2 chronic kidney disease  Lab Results   Component Value Date    EGFR 60 2025    EGFR 63 2025    EGFR 61 2025    CREATININE 1.39 (H) 2025    CREATININE 1.37 (H) 2025    CREATININE 1.48 (H) 2024   Pt follows with Nephro  Currently Cr at baseline as is eGFR  Nephrology unsure of etiology NSIADS vs gout vs intrinsic no current plan to biosy.   Follow up in 6 months  Call if any symptoms of swelling or pain return as we can check kidney fucntion             Screening for colon cancer       Pt due for colon cancer screening. Pt previously ordered referral to GI  Pt was educated on importance of colon cancer screening          History of Present Illness   45 yo male presented to the clinic on  for follow up    Pt notes going well has some right plantar foot pain.   Onset last month but resolved w/in a day.   Pt notes no other issues.       Review of Systems   Constitutional:  Negative for fever and unexpected weight change.   HENT: Negative.     Cardiovascular:  Negative for leg swelling.   Genitourinary: Negative.    Musculoskeletal: Negative.        Objective   /68 (BP Location: Left arm, Patient Position: Sitting, Cuff Size: Large)   Pulse 69   Temp 97.7 °F (36.5 °C) (Tympanic)   Resp 14   Ht 6' 1\" (1.854 m)   Wt 103 kg (226 lb)   SpO2 98%   BMI 29.82 kg/m²      Physical Exam  Vitals and nursing " note reviewed.   Constitutional:       Appearance: Normal appearance. He is normal weight. He is not toxic-appearing.   HENT:      Nose: Nose normal.   Eyes:      Conjunctiva/sclera: Conjunctivae normal.   Cardiovascular:      Rate and Rhythm: Normal rate.   Pulmonary:      Effort: Pulmonary effort is normal. No respiratory distress.   Abdominal:      Tenderness: There is no right CVA tenderness or left CVA tenderness.   Musculoskeletal:      Right lower leg: Edema present.      Left lower leg: Edema present.      Comments: Trace bilateral pitting edema of the lower extremities    Skin:     Findings: No rash.   Neurological:      Mental Status: He is oriented to person, place, and time.   Psychiatric:         Behavior: Behavior normal.

## 2025-05-14 NOTE — TELEPHONE ENCOUNTER
Spoke to patient of recent labs look good and stable.urinalysis are normal.Uric acid looks great at 5.4 . Patient has a follow up with  in 9/25            ----- Message from YARY Nails sent at 5/13/2025  6:13 PM EDT -----  Labs reviewed.  Please call Lashae and let him know that blood work looks very good right now labs are all stable.  All the components of the urinalysis are normal.  Even uric acid looks great at 5.4 which is at target.  As per our last visit he can follow-up in 3 more months with face-to-face visit.